# Patient Record
Sex: FEMALE | Race: WHITE | NOT HISPANIC OR LATINO | Employment: FULL TIME
[De-identification: names, ages, dates, MRNs, and addresses within clinical notes are randomized per-mention and may not be internally consistent; named-entity substitution may affect disease eponyms.]

---

## 2017-07-08 ENCOUNTER — HEALTH MAINTENANCE LETTER (OUTPATIENT)
Age: 47
End: 2017-07-08

## 2017-07-10 ENCOUNTER — OFFICE VISIT (OUTPATIENT)
Dept: PODIATRY | Facility: CLINIC | Age: 47
End: 2017-07-10
Payer: COMMERCIAL

## 2017-07-10 ENCOUNTER — RADIANT APPOINTMENT (OUTPATIENT)
Dept: GENERAL RADIOLOGY | Facility: CLINIC | Age: 47
End: 2017-07-10
Attending: PODIATRIST
Payer: COMMERCIAL

## 2017-07-10 VITALS — BODY MASS INDEX: 23.9 KG/M2 | WEIGHT: 140 LBS | HEIGHT: 64 IN

## 2017-07-10 DIAGNOSIS — S93.104A TOE DISLOCATION, RIGHT, INITIAL ENCOUNTER: ICD-10-CM

## 2017-07-10 DIAGNOSIS — S99.921A INJURY, FOOT, RIGHT, INITIAL ENCOUNTER: Primary | ICD-10-CM

## 2017-07-10 PROCEDURE — 26775 TREAT FINGER DISLOCATION: CPT | Mod: T8 | Performed by: PODIATRIST

## 2017-07-10 PROCEDURE — 73630 X-RAY EXAM OF FOOT: CPT | Mod: RT

## 2017-07-10 PROCEDURE — 73660 X-RAY EXAM OF TOE(S): CPT | Mod: RT

## 2017-07-10 PROCEDURE — 99204 OFFICE O/P NEW MOD 45 MIN: CPT | Mod: 25 | Performed by: PODIATRIST

## 2017-07-10 NOTE — PROGRESS NOTES
PATIENT HISTORY:  Caron Velazquez is a 47 year old female who presents to clinic for a right 4th toe injury.  She suspects it is dislocated.  Occurred yesterday on a step after dropping a pot.  8-10/10 pain.  She has trouble walking.  Works in sales.      Review of Systems:  Patient denies fever, chills, rash, wound, stiffness, limping, numbness, weakness, heart burn, blood in stool, chest pain with activity, calf pain when walking, shortness of breath with activity, chronic cough, easy bleeding/bruising, swelling of ankles, excessive thirst, fatigue, depression, anxiety.       PAST MEDICAL HISTORY:   Past Medical History:   Diagnosis Date     Bulimia nervosa     Bulemia--from [age] y/o     Depressive disorder, not elsewhere classified     anxiety/depression-on prozac for about 20 yrs        PAST SURGICAL HISTORY:   Past Surgical History:   Procedure Laterality Date     NO HISTORY OF SURGERY          MEDICATIONS:   Current Outpatient Prescriptions:      valACYclovir (VALTREX) 500 MG tablet, Take 1 tablet (500 mg) by mouth daily, Disp: 90 tablet, Rfl: 3     Calcium Carbonate-Vitamin D (CALCIUM + D PO), Take  by mouth., Disp: , Rfl:      Pediatric Multivitamins-Fl (MULTI VITAMIN/FLUORIDE) 0.25 MG CHEW, Take  by mouth., Disp: , Rfl:      ALLERGIES:    Allergies   Allergen Reactions     Nkda [No Known Drug Allergies]         SOCIAL HISTORY:   Social History     Social History     Marital status:      Spouse name: N/A     Number of children: 2     Years of education: N/A     Occupational History      Stay At Home Parent     Social History Main Topics     Smoking status: Never Smoker     Smokeless tobacco: Never Used      Comment: socially in college     Alcohol use Yes      Comment: occ     Drug use: No     Sexual activity: Yes     Partners: Male     Birth control/ protection: Condom     Other Topics Concern     Parent/Sibling W/ Cabg, Mi Or Angioplasty Before 65f 55m? No     Social History Narrative        FAMILY  "HISTORY:   Family History   Problem Relation Age of Onset     Asthma No family hx of      C.A.D. No family hx of      DIABETES No family hx of      Hypertension Mother      CEREBROVASCULAR DISEASE Mother      Breast Cancer No family hx of      Cancer - colorectal Mother      Depression Mother      Depression Sister      Depression Brother      Depression Brother      Depression Brother      Alcohol/Drug Father      Alcohol/Drug Sister      Alcohol/Drug Brother      Thyroid Disease Mother      Thyroid Disease Sister      Thyroid Disease Sister         EXAM:Vitals: Ht 5' 4\" (1.626 m)  Wt 140 lb (63.5 kg)  BMI 24.03 kg/m2  BMI= Body mass index is 24.03 kg/(m^2).    General appearance: Patient is alert and fully cooperative with history & exam.  No sign of distress is noted during the visit.     Psychiatric: Affect is pleasant & appropriate.  Patient appears motivated to improve health.     Respiratory: Breathing is regular & unlabored while sitting.     HEENT: Hearing is intact to spoken word.  Speech is clear.  No gross evidence of visual impairment that would impact ambulation.     Dermatologic: Skin is intact to the right foot without significant lesions, rash or abrasion.  No paronychia or evidence of soft tissue infection is noted.     Vascular: DP & PT pulses are intact & regular on the right.  Mild right 4th toe edema.  CFT and skin temperature are normal.  Neurovascular intact to R 4th toe.     Neurologic: Lower extremity sensation is intact to light touch.  No evidence of weakness or contracture in the lower extremities.  No evidence of neuropathy.     Musculoskeletal: Right 4th toe appears dorsally dislocated at the PIPJ.  Patient is ambulatory without assistive device or brace.  No gross ankle deformity noted.  No foot or ankle joint effusion is noted.    Films reviewed with pt.  XRs show dislocation of right 4th toe at PIPJ.  No obvious fracture.  Post reduction films show good reduction at the PIPJ.   "   ASSESSMENT: R 4th toe PIPJ dislocation, traumatic     PLAN:  Reviewed patient's chart in epic.  Discussed condition and treatment options including pros and cons.    I recommended closed reduction.  Pt agreed.  After alcohol prep I anesthetized the right 4th toe with 2cc of 1% lidocaine plain.  The toe was manipulated, the joint reduced, and splinted to the 3rd toe with coban.  Pt tolerated well.    Pt placed in post op shoe.  RICE advised.  Maintain splinting.  Coban given for change of splint as needed.  Crutches given for use as needed.  Minimal walking.      Discussed possible pinning of toe if reduction does not hold.    F/u in 2 wks.       Dg Rondon, ALEK, FACFAS

## 2017-07-10 NOTE — MR AVS SNAPSHOT
After Visit Summary   7/10/2017    Caron Velazquez    MRN: [id number]           Patient Information     Date Of Birth          1970        Visit Information        Provider Department      7/10/2017 3:00 PM Dg Rondon DPM [facility]        Today's Diagnoses     Injury, foot, right, initial encounter    -  1    Toe dislocation, right, initial encounter          Care Instructions    Splint toe with coban  Minimal weight bearing as tolerated in post op shoe with crutch assist as needed  Follow up in 2 weeks      PRICE Therapy    Many aches and pains throughout the foot and ankle can be helped with many simple treatments.  This is usually described as PRICE Therapy.      P - Protection - often times, inflammation/pain in the lower extremity is not able to improve simply because the areas involved are never allowed to rest.  Every step we take can bother the problematic area.  Protecting those areas is an important step in the healing process.  This may involve a walking cast boot, a special insert/orthotic device, an ankle brace, or simply avoiding barefoot walking.    R - Rest - in addition to protecting the foot/ankle, resting is an important, but often times difficult, treatment option.  Getting off your feet when they bother you, and specifically avoiding activities that cause pain/discomfort, are very beneficial to prevent, and treat, foot/ankle pain.      I - Ice - icing regularly can help to decrease inflammation and swelling in the foot, thus decreasing pain.  Using an ice pack or a bag of frozen peas works very well.  Ice for 20 minutes multiple times per day as needed.  Do not place the ice directly on the skin as this can cause tissue damage.    C - Compression - using a compression wrap or an ACE wrap can help to decrease swelling, which can help to decrease pain.  Wearing the wraps is generally not needed at night, but they should be worn on a regular basis when  you are going to be on your feet for prolonged periods as gravity tends to pull fluids down to your feet/ankles.    E - Elevation - elevating your lower extremities multiple times daily for 15-20 minutes can help to decrease swelling, which works well in decreasing pain levels.      NSAID/Tylenol - An anti-inflammatory, like Aleve or ibuprofen, and/or a pain medication, such as Tylenol, can help to improve pain levels and get the issue resolved sooner rather than later.  Anyone with liver issues should be careful with Tylenol, and anyone with high blood pressure or heart, stomach or kidney issues should be careful with anti-inflammatories.  Please ask if you have questions about these medications, including dosage.              Follow-ups after your visit        Who to contact     If you have questions or need follow up information about today's clinic visit or your schedule please contact [facility] directly at [phone].  Normal or non-critical lab and imaging results will be communicated to you by [employer], letter or phone within 4 business days after the clinic has received the results. If you do not hear from us within 7 days, please contact the clinic through [employer] or phone. If you have a critical or abnormal lab result, we will notify you by phone as soon as possible.  Submit refill requests through [employer] or call your pharmacy and they will forward the refill request to us. Please allow 3 business days for your refill to be completed.          Additional Information About Your Visit        [employer] Information     [employer] gives you secure access to your electronic health record. If you see a primary care provider, you can also send messages to your care team and make appointments. If you have questions, please call your primary care clinic.  If you do not have a primary care provider, please call [phone] and they will assist you.        Care EveryWhere ID     This is your Care  "EveryWhere ID. This could be used by other organizations to access your [facility] medical records  [id number]        Your Vitals Were     Height BMI (Body Mass Index)                5' 4\" (1.626 m) 24.03 kg/m2           Blood Pressure from Last 3 Encounters:   12/17/13 118/68   10/16/13 122/84   09/13/12 116/66    Weight from Last 3 Encounters:   07/10/17 140 lb (63.5 kg)   12/17/13 136 lb 2 oz (61.7 kg)   10/16/13 139 lb (63 kg)              We Performed the Following     XR Foot Right G/E 3 Views     XR Toe Right G/E 2 Views        Primary Care Provider Office Phone # Fax #    Nuha RamírezMEGAN [facility] [phone]       [facility] [address] [facility] [address]        Equal Access to Services     DANIEL PAYAN : Hadii sunny ku hadasho Soomaali, waaxda luqadaha, qaybta kaalmada adeegyada, eloisa doll haynasima allan . So [facility] [phone].    ATENCIÓN: Si habla español, tiene a champion disposición servicios gratuitos de asistencia lingüística. Llame al [phone].    We comply with applicable federal civil rights laws and [place] laws. We do not discriminate on the basis of race, color, national origin, age, disability sex, sexual orientation or gender identity.            Thank you!     Thank you for choosing [facility]  for your care. Our goal is always to provide you with excellent care. Hearing back from our patients is one way we can continue to improve our services. Please take a few minutes to complete the written survey that you may receive in the mail after your visit with us. Thank you!             Your Updated Medication List - Protect others around you: Learn how to safely use, store and throw away your medicines at www.disposemymeds.org.          This list is accurate as of: 7/10/17  3:58 PM.  Always use your most recent med list.                   Brand Name Dispense Instructions for use Diagnosis    CALCIUM + D PO      Take  by " mouth.        MULTI VITAMIN/FLUORIDE 0.25 MG Chew      Take  by mouth.        valACYclovir 500 MG tablet    VALTREX    90 tablet    Take 1 tablet (500 mg) by mouth daily    Herpes infection

## 2017-07-10 NOTE — NURSING NOTE
"Chief Complaint   Patient presents with     Toe Pain     R foot 4th toe dislocated DOI: 7/9/17       Initial Ht 5' 4\" (1.626 m)  Wt 140 lb (63.5 kg)  BMI 24.03 kg/m2 Estimated body mass index is 24.03 kg/(m^2) as calculated from the following:    Height as of this encounter: 5' 4\" (1.626 m).    Weight as of this encounter: 140 lb (63.5 kg).  Medication Reconciliation: catherine Osborn MA July 10, 2017 3:05 PM  "

## 2017-07-10 NOTE — LETTER
7/10/2017         RE: Caron Velazquez  [address] [facility] [address]        Dear Colleague,    Thank you for referring your patient, Caron Velazquez, to the [facility]. Please see a copy of my visit note below.    BMI is normal.  TING Osborn MA July 10, 2017 3:05 PM      PATIENT HISTORY:  Caron Velazquez is a 47 year old female who presents to clinic for a right 4th toe injury.  She suspects it is dislocated.  Occurred yesterday on a step after dropping a pot.  8-10/10 pain.  She has trouble walking.  Works in sales.      Review of Systems:  Patient denies fever, chills, rash, wound, stiffness, limping, numbness, weakness, heart burn, blood in stool, chest pain with activity, calf pain when walking, shortness of breath with activity, chronic cough, easy bleeding/bruising, swelling of ankles, excessive thirst, fatigue, depression, anxiety.       PAST MEDICAL HISTORY:   Past Medical History:   Diagnosis Date     Bulimia nervosa     Bulemia--from [age] y/o     Depressive disorder, not elsewhere classified     anxiety/depression-on prozac for about 20 yrs        PAST SURGICAL HISTORY:   Past Surgical History:   Procedure Laterality Date     NO HISTORY OF SURGERY          MEDICATIONS:   Current Outpatient Prescriptions:      valACYclovir (VALTREX) 500 MG tablet, Take 1 tablet (500 mg) by mouth daily, Disp: 90 tablet, Rfl: 3     Calcium Carbonate-Vitamin D (CALCIUM + D PO), Take  by mouth., Disp: , Rfl:      Pediatric Multivitamins-Fl (MULTI VITAMIN/FLUORIDE) 0.25 MG CHEW, Take  by mouth., Disp: , Rfl:      ALLERGIES:    Allergies   Allergen Reactions     Nkda [No Known Drug Allergies]         SOCIAL HISTORY:   Social History     Social History     Marital status:      Spouse name: N/A     Number of children: 2     Years of education: N/A     Occupational History      Stay At Home Parent     Social History Main Topics     Smoking status: Never Smoker     Smokeless tobacco: Never Used       "Comment: socially in college     Alcohol use Yes      Comment: occ     Drug use: No     Sexual activity: Yes     Partners: Male     Birth control/ protection: Condom     Other Topics Concern     Parent/Sibling W/ Cabg, Mi Or Angioplasty Before 65f 55m? No     Social History Narrative        FAMILY HISTORY:   Family History   Problem Relation Age of Onset     Asthma No family hx of      C.A.D. No family hx of      DIABETES No family hx of      Hypertension Mother      CEREBROVASCULAR DISEASE Mother      Breast Cancer No family hx of      Cancer - colorectal Mother      Depression Mother      Depression Sister      Depression Brother      Depression Brother      Depression Brother      Alcohol/Drug Father      Alcohol/Drug Sister      Alcohol/Drug Brother      Thyroid Disease Mother      Thyroid Disease Sister      Thyroid Disease Sister         EXAM:Vitals: Ht 5' 4\" (1.626 m)  Wt 140 lb (63.5 kg)  BMI 24.03 kg/m2  BMI= Body mass index is 24.03 kg/(m^2).    General appearance: Patient is alert and fully cooperative with history & exam.  No sign of distress is noted during the visit.     Psychiatric: Affect is pleasant & appropriate.  Patient appears motivated to improve health.     Respiratory: Breathing is regular & unlabored while sitting.     HEENT: Hearing is intact to spoken word.  Speech is clear.  No gross evidence of visual impairment that would impact ambulation.     Dermatologic: Skin is intact to the right foot without significant lesions, rash or abrasion.  No paronychia or evidence of soft tissue infection is noted.     Vascular: DP & PT pulses are intact & regular on the right.  Mild right 4th toe edema.  CFT and skin temperature are normal.  Neurovascular intact to R 4th toe.     Neurologic: Lower extremity sensation is intact to light touch.  No evidence of weakness or contracture in the lower extremities.  No evidence of neuropathy.     Musculoskeletal: Right 4th toe appears dorsally dislocated at " the PIPJ.  Patient is ambulatory without assistive device or brace.  No gross ankle deformity noted.  No foot or ankle joint effusion is noted.    Films reviewed with pt.  XRs show dislocation of right 4th toe at PIPJ.  No obvious fracture.  Post reduction films show good reduction at the PIPJ.     ASSESSMENT: R 4th toe PIPJ dislocation, traumatic     PLAN:  Reviewed patient's chart in epic.  Discussed condition and treatment options including pros and cons.    I recommended closed reduction.  Pt agreed.  After alcohol prep I anesthetized the right 4th toe with 2cc of 1% lidocaine plain.  The toe was manipulated, the joint reduced, and splinted to the 3rd toe with coban.  Pt tolerated well.    Pt placed in post op shoe.  RICE advised.  Maintain splinting.  Coban given for change of splint as needed.  Crutches given for use as needed.  Minimal walking.      Discussed possible pinning of toe if reduction does not hold.    F/u in 2 wks.       Dg Rondon DPM, FACFAS      Again, thank you for allowing me to participate in the care of your patient.        Sincerely,        Dg Rondon DPM

## 2017-07-10 NOTE — PATIENT INSTRUCTIONS
Splint toe with coban  Minimal weight bearing as tolerated in post op shoe with crutch assist as needed  Follow up in 2 weeks      PRICE Therapy    Many aches and pains throughout the foot and ankle can be helped with many simple treatments.  This is usually described as PRICE Therapy.      P - Protection - often times, inflammation/pain in the lower extremity is not able to improve simply because the areas involved are never allowed to rest.  Every step we take can bother the problematic area.  Protecting those areas is an important step in the healing process.  This may involve a walking cast boot, a special insert/orthotic device, an ankle brace, or simply avoiding barefoot walking.    R - Rest - in addition to protecting the foot/ankle, resting is an important, but often times difficult, treatment option.  Getting off your feet when they bother you, and specifically avoiding activities that cause pain/discomfort, are very beneficial to prevent, and treat, foot/ankle pain.      I - Ice - icing regularly can help to decrease inflammation and swelling in the foot, thus decreasing pain.  Using an ice pack or a bag of frozen peas works very well.  Ice for 20 minutes multiple times per day as needed.  Do not place the ice directly on the skin as this can cause tissue damage.    C - Compression - using a compression wrap or an ACE wrap can help to decrease swelling, which can help to decrease pain.  Wearing the wraps is generally not needed at night, but they should be worn on a regular basis when you are going to be on your feet for prolonged periods as gravity tends to pull fluids down to your feet/ankles.    E - Elevation - elevating your lower extremities multiple times daily for 15-20 minutes can help to decrease swelling, which works well in decreasing pain levels.      NSAID/Tylenol - An anti-inflammatory, like Aleve or ibuprofen, and/or a pain medication, such as Tylenol, can help to improve pain levels and get  the issue resolved sooner rather than later.  Anyone with liver issues should be careful with Tylenol, and anyone with high blood pressure or heart, stomach or kidney issues should be careful with anti-inflammatories.  Please ask if you have questions about these medications, including dosage.

## 2017-07-18 ENCOUNTER — APPOINTMENT (OUTPATIENT)
Dept: GENERAL RADIOLOGY | Facility: CLINIC | Age: 47
End: 2017-07-18
Attending: EMERGENCY MEDICINE
Payer: COMMERCIAL

## 2017-07-18 ENCOUNTER — HOSPITAL ENCOUNTER (EMERGENCY)
Facility: CLINIC | Age: 47
Discharge: HOME | End: 2017-07-18
Attending: EMERGENCY MEDICINE | Admitting: EMERGENCY MEDICINE
Payer: COMMERCIAL

## 2017-07-18 VITALS
SYSTOLIC BLOOD PRESSURE: 118 MMHG | OXYGEN SATURATION: 98 % | DIASTOLIC BLOOD PRESSURE: 82 MMHG | WEIGHT: 137 LBS | HEIGHT: 64 IN | TEMPERATURE: 97.4 F | RESPIRATION RATE: 16 BRPM | BODY MASS INDEX: 23.39 KG/M2 | HEART RATE: 78 BPM

## 2017-07-18 DIAGNOSIS — R55 NEAR SYNCOPE: ICD-10-CM

## 2017-07-18 DIAGNOSIS — R00.2 PALPITATIONS: ICD-10-CM

## 2017-07-18 DIAGNOSIS — F41.9 ANXIETY: ICD-10-CM

## 2017-07-18 LAB
ANION GAP SERPL CALCULATED.3IONS-SCNC: 9 MMOL/L (ref 3–14)
BASOPHILS # BLD AUTO: 0.1 10E9/L (ref 0–0.2)
BASOPHILS NFR BLD AUTO: 1.8 %
BUN SERPL-MCNC: 20 MG/DL (ref 7–30)
CALCIUM SERPL-MCNC: 8.5 MG/DL (ref 8.5–10.1)
CHLORIDE SERPL-SCNC: 100 MMOL/L (ref 94–109)
CO2 SERPL-SCNC: 21 MMOL/L (ref 20–32)
CREAT SERPL-MCNC: 1.08 MG/DL (ref 0.52–1.04)
CREAT SERPL-MCNC: ABNORMAL MG/DL (ref 0.52–1.04)
DIFFERENTIAL METHOD BLD: ABNORMAL
EOSINOPHIL # BLD AUTO: 0.1 10E9/L (ref 0–0.7)
EOSINOPHIL NFR BLD AUTO: 2.1 %
ERYTHROCYTE [DISTWIDTH] IN BLOOD BY AUTOMATED COUNT: 16.6 % (ref 10–15)
GFR SERPL CREATININE-BSD FRML MDRD: 54 ML/MIN/1.7M2
GFR SERPL CREATININE-BSD FRML MDRD: ABNORMAL ML/MIN/1.7M2
GLUCOSE SERPL-MCNC: 85 MG/DL (ref 70–99)
HCT VFR BLD AUTO: 36.9 % (ref 35–47)
HGB BLD-MCNC: 12.3 G/DL (ref 11.7–15.7)
IMM GRANULOCYTES # BLD: 0 10E9/L (ref 0–0.4)
IMM GRANULOCYTES NFR BLD: 0.2 %
INTERPRETATION ECG - MUSE: NORMAL
LYMPHOCYTES # BLD AUTO: 2.2 10E9/L (ref 0.8–5.3)
LYMPHOCYTES NFR BLD AUTO: 43.8 %
MCH RBC QN AUTO: 29.8 PG (ref 26.5–33)
MCHC RBC AUTO-ENTMCNC: 33.3 G/DL (ref 31.5–36.5)
MCV RBC AUTO: 89 FL (ref 78–100)
MONOCYTES # BLD AUTO: 0.8 10E9/L (ref 0–1.3)
MONOCYTES NFR BLD AUTO: 16.2 %
NEUTROPHILS # BLD AUTO: 1.8 10E9/L (ref 1.6–8.3)
NEUTROPHILS NFR BLD AUTO: 35.9 %
NRBC # BLD AUTO: 0 10*3/UL
NRBC BLD AUTO-RTO: 0 /100
PLATELET # BLD AUTO: 309 10E9/L (ref 150–450)
POTASSIUM SERPL-SCNC: 3.6 MMOL/L (ref 3.4–5.3)
POTASSIUM SERPL-SCNC: 8.9 MMOL/L (ref 3.4–5.3)
RBC # BLD AUTO: 4.13 10E12/L (ref 3.8–5.2)
SODIUM SERPL-SCNC: 130 MMOL/L (ref 133–144)
TROPONIN I BLD-MCNC: 0 UG/L (ref 0–0.1)
TROPONIN I SERPL-MCNC: NORMAL UG/L (ref 0–0.04)
TROPONIN I SERPL-MCNC: NORMAL UG/L (ref 0–0.04)
WBC # BLD AUTO: 5.1 10E9/L (ref 4–11)

## 2017-07-18 PROCEDURE — 84132 ASSAY OF SERUM POTASSIUM: CPT | Performed by: EMERGENCY MEDICINE

## 2017-07-18 PROCEDURE — 99285 EMERGENCY DEPT VISIT HI MDM: CPT | Mod: 25 | Performed by: EMERGENCY MEDICINE

## 2017-07-18 PROCEDURE — 71020 XR CHEST 2 VW: CPT

## 2017-07-18 PROCEDURE — 84484 ASSAY OF TROPONIN QUANT: CPT | Performed by: EMERGENCY MEDICINE

## 2017-07-18 PROCEDURE — 85025 COMPLETE CBC W/AUTO DIFF WBC: CPT | Performed by: EMERGENCY MEDICINE

## 2017-07-18 PROCEDURE — 93010 ELECTROCARDIOGRAM REPORT: CPT | Mod: Z6 | Performed by: EMERGENCY MEDICINE

## 2017-07-18 PROCEDURE — 84484 ASSAY OF TROPONIN QUANT: CPT

## 2017-07-18 PROCEDURE — 82565 ASSAY OF CREATININE: CPT | Performed by: EMERGENCY MEDICINE

## 2017-07-18 PROCEDURE — 99285 EMERGENCY DEPT VISIT HI MDM: CPT | Mod: 25

## 2017-07-18 PROCEDURE — 80048 BASIC METABOLIC PNL TOTAL CA: CPT | Performed by: EMERGENCY MEDICINE

## 2017-07-18 PROCEDURE — 93005 ELECTROCARDIOGRAM TRACING: CPT

## 2017-07-18 ASSESSMENT — ENCOUNTER SYMPTOMS
NERVOUS/ANXIOUS: 1
FEVER: 0
NAUSEA: 1
NUMBNESS: 1
PALPITATIONS: 1
ABDOMINAL PAIN: 0
SHORTNESS OF BREATH: 1
FATIGUE: 1

## 2017-07-18 NOTE — ED AVS SNAPSHOT
[facility], [place], Emergency Department    [address] [place] [address]    Phone:  [phone]                                       Caron Velazquez   MRN: [id number]    Department:  [facility], Emergency Department   Date of Visit:  7/18/2017           After Visit Summary Signature Page     I have received my discharge instructions, and my questions have been answered. I have discussed any challenges I see with this plan with the nurse or doctor.    ..........................................................................................................................................  Patient/Patient Representative Signature      ..........................................................................................................................................  Patient Representative Print Name and Relationship to Patient    ..................................................               ................................................  Date                                            Time    ..........................................................................................................................................  Reviewed by Signature/Title    ...................................................              ..............................................  Date                                                            Time

## 2017-07-18 NOTE — ED NOTES
47 year old female presented by EMS with complaints of increasing shortness of breath with exertion and then today developed diaphoresis and dizziness.

## 2017-07-18 NOTE — ED AVS SNAPSHOT
[facility], Emergency Department    [address] [facility] [address]    Phone:  [phone]                                       Caron Velazquez   MRN: [id number]    Department:  [facility], Emergency Department   Date of Visit:  7/18/2017           Patient Information     Date Of Birth          1970        Your diagnoses for this visit were:     Near syncope     Palpitations     Anxiety        You were seen by Javad Acuña MD.        Discharge Instructions       All of your tests are normal  Obtain a primary care provider    24 Hour Appointment Hotline       To make an appointment at any [facility] clinic, call [phone] ([phone]). If you don't have a family doctor or clinic, we will help you find one. [facility] clinics are conveniently located to serve the needs of you and your family.             Review of your medicines      Our records show that you are taking the medicines listed below. If these are incorrect, please call your family doctor or clinic.        Dose / Directions Last dose taken    ADDERALL PO   Dose:  10 mg        Take 10 mg by mouth daily   Refills:  0        CALCIUM + D PO        Take  by mouth.   Refills:  0        MULTI VITAMIN/FLUORIDE 0.25 MG Chew        Take  by mouth.   Refills:  0        * order for DME   Quantity:  1 Device        Post op shoe wom LG   Refills:  0        * order for DME   Quantity:  1 Device        Pair of crutches   Refills:  0        valACYclovir 500 MG tablet   Commonly known as:  VALTREX   Dose:  500 mg   Quantity:  90 tablet        Take 1 tablet (500 mg) by mouth daily   Refills:  3        * Notice:  This list has 2 medication(s) that are the same as other medications prescribed for you. Read the directions carefully, and ask your doctor or other care provider to review them with you.            Procedures and tests performed during your visit     Procedure/Test Number of Times Performed    Basic metabolic panel 1    CBC with  platelets differential 1    Creatinine 1    EKG 12-lead, tracing only 1    ISTAT troponin nursing POCT 1    Peripheral IV: Standard 1    Potassium 1    Troponin I 2    Troponin POCT 1    XR Chest 2 Views 1      Orders Needing Specimen Collection     None      Pending Results     No orders found from 7/16/2017 to 7/19/2017.            Pending Culture Results     No orders found from 7/16/2017 to 7/19/2017.            Pending Results Instructions     If you had any lab results that were not finalized at the time of your Discharge, you can call the ED Lab Result RN at [phone]. You will be contacted by this team for any positive Lab results or changes in treatment. The nurses are available 7 days a week from 10A to 6:30P.  You can leave a message 24 hours per day and they will return your call.        Thank you for choosing [facility]       Thank you for choosing [facility] for your care. Our goal is always to provide you with excellent care. Hearing back from our patients is one way we can continue to improve our services. Please take a few minutes to complete the written survey that you may receive in the mail after you visit with us. Thank you!        [employer] Information     [employer] gives you secure access to your electronic health record. If you see a primary care provider, you can also send messages to your care team and make appointments. If you have questions, please call your primary care clinic.  If you do not have a primary care provider, please call [phone] and they will assist you.        Care EveryWhere ID     This is your Care EveryWhere ID. This could be used by other organizations to access your [facility] medical records  [id number]        Equal Access to Services     DANIEL PAYAN : Hadii sunny huizaro Sosarah, waaxda luqadaha, qaybta kaalmada adesimon, eloisa aguirre. So [facility] [phone].    ATENCIÓN: Si habla español, tiene a champion disposición servicios gratuitos de  asistencia lingüística. Saw al [phone].    We comply with applicable federal civil rights laws and [place] laws. We do not discriminate on the basis of race, color, national origin, age, disability sex, sexual orientation or gender identity.            After Visit Summary       This is your record. Keep this with you and show to your community pharmacist(s) and doctor(s) at your next visit.

## 2017-07-18 NOTE — ED PROVIDER NOTES
"  History     Chief Complaint   Patient presents with     Shortness of Breath     HPI  Caron Velazquez is a 47 year old female who with a history of anxiety and depression who presents to the Emergency Department via EMS for evaluation of shortness of breath. The patient reports she went to urgent care two weeks ago with concern of cardiac problems where she was told to come to the ED, but she decided not to go. Today, the patient says she was driving with her son when she suddenly experienced body numbness, heart racing, and shortness of breath leading her to return home and call 911. She also says she has felt nauseous all day. She states she has had panic attacks in the past, but cites this was \"different\". Of note, she says she has recently felt stressed and has experienced fatigue while performing daily activities. She denies any recent travel or bilateral lower extremity swelling. She denies any history of cardiac or breathing problems.    Past Medical History:   Diagnosis Date     ADD (attention deficit disorder)      Bulimia nervosa     Bulemia--from [age] y/o     Depressive disorder      Depressive disorder, not elsewhere classified     anxiety/depression-on prozac for about 20 yrs       Past Surgical History:   Procedure Laterality Date     HEAD & NECK SURGERY      [place] teeth     NO HISTORY OF SURGERY         Family History   Problem Relation Age of Onset     Asthma No family hx of      C.A.D. No family hx of      DIABETES No family hx of      Hypertension Mother      CEREBROVASCULAR DISEASE Mother      Breast Cancer No family hx of      Cancer - colorectal Mother      Depression Mother      Depression Sister      Depression Brother      Depression Brother      Depression Brother      Alcohol/Drug Father      Alcohol/Drug Sister      Alcohol/Drug Brother      Thyroid Disease Mother      Thyroid Disease Sister      Thyroid Disease Sister        Social History   Substance Use Topics     Smoking status: Former " "Smoker     Smokeless tobacco: Never Used      Comment: socially in college     Alcohol use Yes      Comment: occ       No current facility-administered medications for this encounter.      Current Outpatient Prescriptions   Medication     valACYclovir (VALTREX) 500 MG tablet     Calcium Carbonate-Vitamin D (CALCIUM + D PO)     Pediatric Multivitamins-Fl (MULTI VITAMIN/FLUORIDE) 0.25 MG CHEW     Amphetamine-Dextroamphetamine (ADDERALL PO)     order for DME     order for DME        Allergies   Allergen Reactions     Nkda [No Known Drug Allergies]          I have reviewed the Medications, Allergies, Past Medical and Surgical History, and Social History in the Epic system.    Review of Systems   Constitutional: Positive for fatigue. Negative for fever.   Respiratory: Positive for shortness of breath.    Cardiovascular: Positive for palpitations. Negative for chest pain and leg swelling.   Gastrointestinal: Positive for nausea. Negative for abdominal pain.   Neurological: Positive for numbness.   Psychiatric/Behavioral: The patient is nervous/anxious.    All other systems reviewed and are negative.      Physical Exam   BP: (!) 145/96  Pulse: 92  Temp: 97.4  F (36.3  C)  Resp: 16  Height: 162.6 cm (5' 4\")  Weight: 62.1 kg (137 lb)  SpO2: 99 %  Physical Exam   Constitutional: She is oriented to person, place, and time. She appears well-developed and well-nourished. She appears distressed.   HENT:   Head: Normocephalic and atraumatic.   Mouth/Throat: Oropharynx is clear and moist.   Eyes: Pupils are equal, round, and reactive to light.   Neck: Normal range of motion. Neck supple. No JVD present.   Cardiovascular: Normal rate, regular rhythm, normal heart sounds and intact distal pulses.    Pulmonary/Chest: Effort normal and breath sounds normal. No respiratory distress. She has no wheezes. She has no rales.   Abdominal: Soft. She exhibits no mass. There is no tenderness. There is no guarding.   Musculoskeletal: Normal range " of motion. She exhibits no edema or tenderness.        Right lower leg: Normal.        Left lower leg: Normal.   Neurological: She is alert and oriented to person, place, and time. No cranial nerve deficit.   Skin: Skin is warm and dry. She is not diaphoretic.   Psychiatric: Her behavior is normal. Thought content normal. Her mood appears anxious.   Nursing note and vitals reviewed.      ED Course     ED Course     Procedures       6:06 PM  The patient was seen and examined by Dr. Acuña in Room ED20.          EKG Interpretation:      Interpreted by Javad Acuña  Time reviewed: 1830  Symptoms at time of EKG: palpitations   Rhythm: normal sinus   Rate: normal  Axis: normal  Ectopy: none  Conduction: normal  ST Segments/ T Waves: No ST-T wave changes  Q Waves: none  Comparison to prior: No old EKG available    Clinical Impression: normal EKG             Labs Ordered and Resulted from Time of ED Arrival Up to the Time of Departure from the ED   CBC WITH PLATELETS DIFFERENTIAL - Abnormal; Notable for the following:        Result Value    RDW 16.6 (*)     All other components within normal limits   BASIC METABOLIC PANEL - Abnormal; Notable for the following:     Sodium 130 (*)     Potassium 8.9 (*)     All other components within normal limits   CREATININE - Abnormal; Notable for the following:     Creatinine 1.08 (*)     GFR Estimate 54 (*)     All other components within normal limits   TROPONIN I   POTASSIUM   TROPONIN I   PERIPHERAL IV CATHETER   ISTAT TROPONIN NURSING POCT   TROPONIN POCT            Assessments & Plan (with Medical Decision Making)   39-year-old female without significant past medical history other than anxiety who presents after episode of palpitations, dyspnea, lightheadedness, weakness, and feeling pre-syncopal.  Clinically, it sounds as though she had a panic attack.  She has no past cardiac history. Here her EKG is normal. Her routine labs and chest x-ray are normal.  She does  have a history of anxiety. She s been having symptoms like this recently.  We did talk about techniques to calm herself down should this happen again, but also demonstrated how to check her pulse and count for 15 seconds to actually document a true pulse when she is having one of these spells.  I don t feel that at this point she requires a Holter monitor or any other additional workup at this time as she did not have syncope.  She had classic symptoms of an anxiety attack.     I have reviewed the nursing notes.    I have reviewed the findings, diagnosis, plan and need for follow up with the patient.  This part of the document was transcribed by Alpa Perez Medical Scribe.   Discharge Medication List as of 7/18/2017  8:20 PM          Final diagnoses:   Near syncope   Palpitations   Anxiety   I, Jair French, am serving as a trained medical scribe to document services personally performed by Rajeev Acuña MD, based on the provider's statements to me.      I, Rajeev Acuña MD, was physically present and have reviewed and verified the accuracy of this note documented by Jair French.       7/18/2017   [facility], [place], EMERGENCY DEPARTMENT     Javad Acuña MD  07/25/17 4306

## 2019-10-05 ENCOUNTER — HEALTH MAINTENANCE LETTER (OUTPATIENT)
Age: 49
End: 2019-10-05

## 2020-11-14 ENCOUNTER — HEALTH MAINTENANCE LETTER (OUTPATIENT)
Age: 50
End: 2020-11-14

## 2021-02-06 ENCOUNTER — HEALTH MAINTENANCE LETTER (OUTPATIENT)
Age: 51
End: 2021-02-06

## 2021-09-12 ENCOUNTER — HEALTH MAINTENANCE LETTER (OUTPATIENT)
Age: 51
End: 2021-09-12

## 2021-12-09 ENCOUNTER — TELEPHONE (OUTPATIENT)
Dept: FAMILY MEDICINE | Facility: CLINIC | Age: 51
End: 2021-12-09
Payer: COMMERCIAL

## 2021-12-09 ASSESSMENT — PATIENT HEALTH QUESTIONNAIRE - PHQ9: SUM OF ALL RESPONSES TO PHQ QUESTIONS 1-9: 20

## 2021-12-09 NOTE — TELEPHONE ENCOUNTER
Left detailed message on patient's voice mail to call for a virtual appointmennt,  Irina Oakes RN  [facility]

## 2021-12-09 NOTE — TELEPHONE ENCOUNTER
Patient is asking for a refill on her medications, Adderall and Prozac.Would at least like to get the Prozac.    Has not been seen in the [facility] system for years.  Has an appointment to establish care and preventive visit. but earliest available was 1/6/22.  Could patient do a virtual visit to get started on Medication next week and do the physical next month?  Her PHQ 9 today was 20 and she feels she is getting worse. Knows the Prozac has worked for her in the past.  Irina Oakes RN  [facility]  PHQ 12/9/2021   PHQ-9 Total Score 20   Q9: Thoughts of better off dead/self-harm past 2 weeks Not at all

## 2021-12-27 NOTE — TELEPHONE ENCOUNTER
Dr Dasilva - Patient is on your schedule to establish care on Thursday, 1/6/22.  Irina Oakes RN  [facility]

## 2022-01-02 ENCOUNTER — HEALTH MAINTENANCE LETTER (OUTPATIENT)
Age: 52
End: 2022-01-02

## 2022-01-03 ASSESSMENT — ENCOUNTER SYMPTOMS
CHILLS: 0
FREQUENCY: 0
FEVER: 0
SORE THROAT: 0
DYSURIA: 0
HEARTBURN: 0
BREAST MASS: 0
MYALGIAS: 0
COUGH: 0
HEMATOCHEZIA: 0
EYE PAIN: 0
DIARRHEA: 0
SHORTNESS OF BREATH: 0
NAUSEA: 0
ARTHRALGIAS: 0
PARESTHESIAS: 0
DIZZINESS: 0
HEADACHES: 0
PALPITATIONS: 0
CONSTIPATION: 0
WEAKNESS: 0
JOINT SWELLING: 0
NERVOUS/ANXIOUS: 1
ABDOMINAL PAIN: 0
HEMATURIA: 0

## 2022-01-06 ENCOUNTER — OFFICE VISIT (OUTPATIENT)
Dept: FAMILY MEDICINE | Facility: CLINIC | Age: 52
End: 2022-01-06
Payer: COMMERCIAL

## 2022-01-06 VITALS
HEART RATE: 95 BPM | RESPIRATION RATE: 16 BRPM | SYSTOLIC BLOOD PRESSURE: 144 MMHG | WEIGHT: 142 LBS | BODY MASS INDEX: 24.37 KG/M2 | DIASTOLIC BLOOD PRESSURE: 100 MMHG | TEMPERATURE: 97.8 F | OXYGEN SATURATION: 97 %

## 2022-01-06 DIAGNOSIS — Z00.00 ROUTINE GENERAL MEDICAL EXAMINATION AT A HEALTH CARE FACILITY: Primary | ICD-10-CM

## 2022-01-06 DIAGNOSIS — F41.1 GENERALIZED ANXIETY DISORDER: ICD-10-CM

## 2022-01-06 DIAGNOSIS — Z12.4 SCREENING FOR CERVICAL CANCER: ICD-10-CM

## 2022-01-06 DIAGNOSIS — F33.1 MAJOR DEPRESSIVE DISORDER, RECURRENT EPISODE, MODERATE (H): ICD-10-CM

## 2022-01-06 DIAGNOSIS — Z12.31 VISIT FOR SCREENING MAMMOGRAM: ICD-10-CM

## 2022-01-06 DIAGNOSIS — R03.0 ELEVATED BLOOD PRESSURE READING WITHOUT DIAGNOSIS OF HYPERTENSION: ICD-10-CM

## 2022-01-06 DIAGNOSIS — F90.2 ATTENTION DEFICIT HYPERACTIVITY DISORDER, COMBINED TYPE: ICD-10-CM

## 2022-01-06 DIAGNOSIS — Z12.11 SCREEN FOR COLON CANCER: ICD-10-CM

## 2022-01-06 DIAGNOSIS — Z13.6 CARDIOVASCULAR SCREENING; LDL GOAL LESS THAN 160: ICD-10-CM

## 2022-01-06 PROBLEM — F41.0 GENERALIZED ANXIETY DISORDER WITH PANIC ATTACKS: Status: ACTIVE | Noted: 2020-06-19

## 2022-01-06 PROBLEM — J30.9 ALLERGIC SINUSITIS: Status: ACTIVE | Noted: 2021-08-01

## 2022-01-06 PROCEDURE — 99214 OFFICE O/P EST MOD 30 MIN: CPT | Mod: 25 | Performed by: FAMILY MEDICINE

## 2022-01-06 PROCEDURE — 84443 ASSAY THYROID STIM HORMONE: CPT | Performed by: FAMILY MEDICINE

## 2022-01-06 PROCEDURE — 80061 LIPID PANEL: CPT | Performed by: FAMILY MEDICINE

## 2022-01-06 PROCEDURE — 99386 PREV VISIT NEW AGE 40-64: CPT | Performed by: FAMILY MEDICINE

## 2022-01-06 PROCEDURE — 36415 COLL VENOUS BLD VENIPUNCTURE: CPT | Performed by: FAMILY MEDICINE

## 2022-01-06 PROCEDURE — G0145 SCR C/V CYTO,THINLAYER,RESCR: HCPCS | Performed by: FAMILY MEDICINE

## 2022-01-06 PROCEDURE — 96127 BRIEF EMOTIONAL/BEHAV ASSMT: CPT | Performed by: FAMILY MEDICINE

## 2022-01-06 PROCEDURE — 87624 HPV HI-RISK TYP POOLED RSLT: CPT | Performed by: FAMILY MEDICINE

## 2022-01-06 RX ORDER — HYDROXYZINE PAMOATE 25 MG/1
25-50 CAPSULE ORAL
COMMUNITY
Start: 2020-05-18 | End: 2022-02-22

## 2022-01-06 RX ORDER — DEXTROAMPHETAMINE SACCHARATE, AMPHETAMINE ASPARTATE, DEXTROAMPHETAMINE SULFATE AND AMPHETAMINE SULFATE 1.25; 1.25; 1.25; 1.25 MG/1; MG/1; MG/1; MG/1
5 TABLET ORAL 2 TIMES DAILY
Qty: 60 TABLET | Refills: 0 | Status: SHIPPED | OUTPATIENT
Start: 2022-01-06 | End: 2022-02-22

## 2022-01-06 RX ORDER — PHENTERMINE HYDROCHLORIDE 37.5 MG/1
TABLET ORAL
COMMUNITY
Start: 2021-11-03 | End: 2022-01-06 | Stop reason: ALTCHOICE

## 2022-01-06 RX ORDER — FLUOXETINE 10 MG/1
10 CAPSULE ORAL DAILY
Qty: 90 CAPSULE | Refills: 1 | Status: SHIPPED | OUTPATIENT
Start: 2022-01-06 | End: 2022-08-30

## 2022-01-06 RX ORDER — FLUOXETINE 10 MG/1
20 CAPSULE ORAL
COMMUNITY
Start: 2020-07-31 | End: 2022-01-06

## 2022-01-06 RX ORDER — FLUOXETINE 10 MG/1
10 CAPSULE ORAL DAILY
Qty: 90 CAPSULE | Refills: 1 | Status: SHIPPED | OUTPATIENT
Start: 2022-01-06 | End: 2022-01-06

## 2022-01-06 ASSESSMENT — ENCOUNTER SYMPTOMS
DIARRHEA: 0
DIZZINESS: 0
MYALGIAS: 0
HEARTBURN: 0
BREAST MASS: 0
ABDOMINAL PAIN: 0
NAUSEA: 0
HEMATOCHEZIA: 0
COUGH: 0
CHILLS: 0
SORE THROAT: 0
DYSURIA: 0
PALPITATIONS: 0
FREQUENCY: 0
SHORTNESS OF BREATH: 0
EYE PAIN: 0
CONSTIPATION: 0
JOINT SWELLING: 0
FEVER: 0
ARTHRALGIAS: 0
PARESTHESIAS: 0
NERVOUS/ANXIOUS: 1
HEADACHES: 0
HEMATURIA: 0
WEAKNESS: 0

## 2022-01-06 NOTE — PATIENT INSTRUCTIONS
You can call any of the centes below to schedule your mammogram, or schedule directly through [employer].  1.  Mammography [facility]  Appointment line [phone]  2   [facility]   Appointment line [phone]   3.   [facility]   Appointment line [phone]      Preventive Health Recommendations  Female Ages 50 - 64    Yearly exam: See your health care provider every year in order to  o Review health changes.   o Discuss preventive care.    o Review your medicines if your doctor has prescribed any.      Get a Pap test every three years (unless you have an abnormal result and your provider advises testing more often).    If you get Pap tests with HPV test, you only need to test every 5 years, unless you have an abnormal result.     You do not need a Pap test if your uterus was removed (hysterectomy) and you have not had cancer.    You should be tested each year for STDs (sexually transmitted diseases) if you're at risk.     Have a mammogram every 1 to 2 years.    Have a colonoscopy at age 50, or have a yearly FIT test (stool test). These exams screen for colon cancer.      Have a cholesterol test every 5 years, or more often if advised.    Have a diabetes test (fasting glucose) every three years. If you are at risk for diabetes, you should have this test more often.     If you are at risk for osteoporosis (brittle bone disease), think about having a bone density scan (DEXA).    Shots: Get a flu shot each year. Get a tetanus shot every 10 years.    Nutrition:     Eat at least 5 servings of fruits and vegetables each day.    Eat whole-grain bread, whole-wheat pasta and brown rice instead of white grains and rice.    Get adequate Calcium and Vitamin D.     Lifestyle    Exercise at least 150 minutes a week (30 minutes a day, 5 days a week). This will help you control your weight and prevent disease.    Limit alcohol to one drink per day.    No smoking.     Wear sunscreen  to prevent skin cancer.     See your dentist every six months for an exam and cleaning.    See your eye doctor every 1 to 2 years.

## 2022-01-06 NOTE — PROGRESS NOTES
SUBJECTIVE:   CC: Caron Velazquez is an 51 year old woman who presents for preventive health visit and chronic disease management.    Depression and Anxiety Follow-Up    How are you doing with your depression since your last visit? No change    How are you doing with your anxiety since your last visit?  No change    Are you having other symptoms that might be associated with depression or anxiety? Yes:  worrying, feeling down    Have you had a significant life event? OTHER: situation stress     Do you have any concerns with your use of alcohol or other drugs? No    Social History     Tobacco Use     Smoking status: Former Smoker     Smokeless tobacco: Never Used     Tobacco comment: socially in [employer]   Substance Use Topics     Alcohol use: Yes     Comment: occ     Drug use: No     PHQ 12/9/2021   PHQ-9 Total Score 20   Q9: Thoughts of better off dead/self-harm past 2 weeks Not at all     ARIANNA-7 SCORE 9/13/2012 10/16/2013 12/17/2013   Total Score 4 18 17       Suicide Assessment Five-step Evaluation and Treatment (SAFE-T)  Patient has been advised of split billing requirements and indicates understanding: Yes     Healthy Habits:     Getting at least 3 servings of Calcium per day:  NO    Bi-annual eye exam:  Yes    Dental care twice a year:  NO    Sleep apnea or symptoms of sleep apnea:  None    Diet:  Regular (no restrictions)    Frequency of exercise:  1 day/week    Duration of exercise:  30-45 minutes    Taking medications regularly:  Yes    Medication side effects:  Not applicable    PHQ-2 Total Score: 2    Additional concerns today:  Yes    ARIANNA-7 SCORE 9/13/2012 10/16/2013 12/17/2013   Total Score 4 18 17     Today's PHQ-2 Score:   PHQ-2 ( 1999 [employer]) 1/3/2022   Q1: Little interest or pleasure in doing things 1   Q2: Feeling down, depressed or hopeless 1   PHQ-2 Score 2   Q1: Little interest or pleasure in doing things Several days   Q2: Feeling down, depressed or hopeless Several days   PHQ-2 Score 2        Abuse: Current or Past (Physical, Sexual or Emotional) - No  Do you feel safe in your environment? Yes      Social History     Tobacco Use     Smoking status: Former Smoker     Smokeless tobacco: Never Used     Tobacco comment: socially in college   Substance Use Topics     Alcohol use: Yes     Comment: occ     If you drink alcohol do you typically have >3 drinks per day or >7 drinks per week? No    No flowsheet data found.    Reviewed orders with patient.  Reviewed health maintenance and updated orders accordingly - Yes  BP Readings from Last 3 Encounters:   01/06/22 (!) 144/100   07/18/17 118/82   12/17/13 118/68    Wt Readings from Last 3 Encounters:   01/06/22 64.4 kg (142 lb)   07/18/17 62.1 kg (137 lb)   07/10/17 63.5 kg (140 lb)                  Current Outpatient Medications   Medication Sig Dispense Refill     amphetamine-dextroamphetamine (ADDERALL) 5 MG tablet Take 1 tablet (5 mg) by mouth 2 times daily 60 tablet 0     FLUoxetine (PROZAC) 10 MG capsule Take 1 capsule (10 mg) by mouth daily 90 capsule 1     hydrOXYzine (VISTARIL) 25 MG capsule Take 25-50 mg by mouth       Amphetamine-Dextroamphetamine (ADDERALL PO) Take 10 mg by mouth daily (Patient not taking: Reported on 1/6/2022)       Allergies   Allergen Reactions     Morphine        Breast Cancer Screening:    FHS-7:   Breast CA Risk Assessment (FHS-7) 1/3/2022   Did any of your first-degree relatives have breast or ovarian cancer? No   Did any of your relatives have bilateral breast cancer? No   Did any man in your family have breast cancer? No   Did any woman in your family have breast and ovarian cancer? No   Did any woman in your family have breast cancer before age 50 y? No   Do you have 2 or more relatives with breast and/or ovarian cancer? No   Do you have 2 or more relatives with breast and/or bowel cancer? No       Mammogram Screening: Recommended annual mammography  Pertinent mammograms are reviewed under the imaging tab.    History  of abnormal Pap smear: NO - age 30-65 PAP every 5 years with negative HPV co-testing recommended  PAP / HPV 2013   PAP (Historical) NIL NIL     Reviewed and updated as needed this visit by clinical staff  Tobacco  Allergies  Meds  Problems  Med Hx  Surg Hx  Fam Hx  Soc Hx         Reviewed and updated as needed this visit by Provider    Meds  Problems  Med Hx  Surg Hx  Fam Hx        Past Medical History:   Diagnosis Date     ADD (attention deficit disorder)      Bulimia nervosa     Bulemia--from [age] y/o     Depressive disorder      Depressive disorder, not elsewhere classified     anxiety/depression-on prozac for about 20 yrs      Past Surgical History:   Procedure Laterality Date     HEAD & NECK SURGERY      [place] teeth     OB History    Para Term  AB Living   3 3 2 0 0 2   SAB IAB Ectopic Multiple Live Births   0 0 0 0 3      # Outcome Date GA Lbr Abisai/2nd Weight Sex Delivery Anes PTL Lv   3 Term 03 40w0d   M    ANGY   2 Term 01 40w0d   M Vag-Forceps   ANGY   1 Para 00    F    DEC      Birth Comments:  Trisomy 18       Review of Systems   Constitutional: Negative for chills and fever.   HENT: Positive for congestion. Negative for ear pain, hearing loss and sore throat.    Eyes: Positive for visual disturbance. Negative for pain.   Respiratory: Negative for cough and shortness of breath.    Cardiovascular: Negative for chest pain, palpitations and peripheral edema.   Gastrointestinal: Negative for abdominal pain, constipation, diarrhea, heartburn, hematochezia and nausea.   Breasts:  Negative for tenderness, breast mass and discharge.   Genitourinary: Negative for dysuria, frequency, genital sores, hematuria, pelvic pain, urgency, vaginal bleeding and vaginal discharge.   Musculoskeletal: Negative for arthralgias, joint swelling and myalgias.   Skin: Negative for rash.   Neurological: Negative for dizziness, weakness, headaches and paresthesias.    Psychiatric/Behavioral: Negative for mood changes. The patient is nervous/anxious.        OBJECTIVE:   BP (!) 144/100 (BP Location: Right arm, Patient Position: Sitting, Cuff Size: Adult Regular)   Pulse 95   Temp 97.8  F (36.6  C) (Temporal)   Resp 16   Wt 64.4 kg (142 lb)   SpO2 97%   BMI 24.37 kg/m    Physical Exam  GENERAL: healthy, alert and no distress  EYES: Eyes grossly normal to inspection, PERRL and conjunctivae and sclerae normal  HENT: ear canals and TM's normal, nose and mouth without ulcers or lesions  NECK: no adenopathy, no asymmetry, masses, or scars and thyroid normal to palpation  RESP: lungs clear to auscultation - no rales, rhonchi or wheezes  BREAST: normal without masses, tenderness or nipple discharge and no palpable axillary masses or adenopathy  CV: regular rate and rhythm, normal S1 S2, no S3 or S4, no murmur, click or rub, no peripheral edema and peripheral pulses strong  ABDOMEN: soft, nontender, no hepatosplenomegaly, no masses and bowel sounds normal   (female): normal female external genitalia, normal urethral meatus, vaginal mucosa pink, moist, well rugated, and normal cervix/adnexa/uterus without masses or discharge  MS: no gross musculoskeletal defects noted, no edema  SKIN: no suspicious lesions or rashes  NEURO: Normal strength and tone, mentation intact and speech normal  PSYCH: mentation appears normal, affect normal/bright      ASSESSMENT/PLAN:   (Z00.00) Routine general medical examination at a health care facility  (primary encounter diagnosis)  routine    (F90.2) Attention deficit hyperactivity disorder, combined type  Comment: At goal  The current medical regimen is effective;  continue present plan and medications.    Plan: amphetamine-dextroamphetamine (ADDERALL) 5 MG         tablet            (F33.1) Major depressive disorder, recurrent episode, moderate (H)  Comment: .  Plan: not at goal, very elevated, associate with ARIANNA as well, see below  She's been on  "fluoxetine in the past and felt it helped, would like to restart, see orders below.  (F41.1) Generalized anxiety disorder  Comment:   Given elevated blood pressure today will also check TSH  Plan: TSH with free T4 reflex, FLUoxetine (PROZAC) 10        MG capsule, DISCONTINUED: FLUoxetine (PROZAC)         10 MG capsule            (R03.0) Elevated blood pressure reading without diagnosis of hypertension  Comment:   BP Readings from Last 3 Encounters:   01/06/22 (!) 144/100   07/18/17 118/82   12/17/13 118/68     Seems to be stress/anxiety related.   Plan: recommend monitoring at home, let me know if continues to be elevated.    GFR Estimate   Date Value Ref Range Status   07/18/2017 54 (L) >60 mL/min/1.7m2 Final     Comment:     Non  GFR Calc   07/18/2017 Not Calculated >60 mL/min/1.7m2 Final       (Z12.4) Screening for cervical cancer  Comment:   Plan: Pap screen with HPV - recommended age 30 - 65         years, HPV Hold (Lab Only), HPV High Risk Types        DNA Cervical            (Z13.6) CARDIOVASCULAR SCREENING; LDL GOAL LESS THAN 160  Comment:   Plan: Lipid panel reflex to direct LDL Non-fasting            (Z12.11) Screen for colon cancer  Comment:   Plan: Adult Gastro Ref - Procedure Only            (Z12.31) Visit for screening mammogram  Comment:   Plan: *MA Screening Digital Bilateral              Patient has been advised of split billing requirements and indicates understanding: Yes  COUNSELING:  Reviewed preventive health counseling, as reflected in patient instructions    Estimated body mass index is 24.37 kg/m  as calculated from the following:    Height as of 7/18/17: 1.626 m (5' 4\").    Weight as of this encounter: 64.4 kg (142 lb).        She reports that she has quit smoking. She has never used smokeless tobacco.      Counseling Resources:  ATP IV Guidelines  Pooled Cohorts Equation Calculator  Breast Cancer Risk Calculator  BRCA-Related Cancer Risk Assessment: FHS-7 Tool  FRAX Risk " Assessment  ICSI Preventive Guidelines  Dietary Guidelines for Americans, 2010  USDA's MyPlate  ASA Prophylaxis  Lung CA Screening    Nuria Dasilva MD  [facility]

## 2022-01-07 LAB
CHOLEST SERPL-MCNC: 233 MG/DL
FASTING STATUS PATIENT QL REPORTED: NO
HDLC SERPL-MCNC: 108 MG/DL
LDLC SERPL CALC-MCNC: 113 MG/DL
NONHDLC SERPL-MCNC: 125 MG/DL
TRIGL SERPL-MCNC: 59 MG/DL
TSH SERPL DL<=0.005 MIU/L-ACNC: 1.66 MU/L (ref 0.4–4)

## 2022-01-11 LAB
BKR LAB AP GYN ADEQUACY: NORMAL
BKR LAB AP GYN INTERPRETATION: NORMAL
BKR LAB AP HPV REFLEX: NORMAL
BKR LAB AP PREVIOUS ABNORMAL: NORMAL
PATH REPORT.COMMENTS IMP SPEC: NORMAL
PATH REPORT.COMMENTS IMP SPEC: NORMAL
PATH REPORT.RELEVANT HX SPEC: NORMAL

## 2022-01-13 PROBLEM — Z87.42 HX OF ABNORMAL CERVICAL PAP SMEAR: Status: ACTIVE | Noted: 2022-01-13

## 2022-01-13 PROBLEM — R03.0 ELEVATED BLOOD PRESSURE READING WITHOUT DIAGNOSIS OF HYPERTENSION: Status: ACTIVE | Noted: 2022-01-13

## 2022-01-13 LAB
HUMAN PAPILLOMA VIRUS 16 DNA: NEGATIVE
HUMAN PAPILLOMA VIRUS 18 DNA: NEGATIVE
HUMAN PAPILLOMA VIRUS FINAL DIAGNOSIS: NORMAL
HUMAN PAPILLOMA VIRUS OTHER HR: NEGATIVE

## 2022-01-13 ASSESSMENT — ANXIETY QUESTIONNAIRES
2. NOT BEING ABLE TO STOP OR CONTROL WORRYING: SEVERAL DAYS
1. FEELING NERVOUS, ANXIOUS, OR ON EDGE: SEVERAL DAYS
GAD7 TOTAL SCORE: 4
6. BECOMING EASILY ANNOYED OR IRRITABLE: NOT AT ALL
7. FEELING AFRAID AS IF SOMETHING AWFUL MIGHT HAPPEN: NOT AT ALL
IF YOU CHECKED OFF ANY PROBLEMS ON THIS QUESTIONNAIRE, HOW DIFFICULT HAVE THESE PROBLEMS MADE IT FOR YOU TO DO YOUR WORK, TAKE CARE OF THINGS AT HOME, OR GET ALONG WITH OTHER PEOPLE: SOMEWHAT DIFFICULT
5. BEING SO RESTLESS THAT IT IS HARD TO SIT STILL: NOT AT ALL
3. WORRYING TOO MUCH ABOUT DIFFERENT THINGS: SEVERAL DAYS

## 2022-01-13 ASSESSMENT — PATIENT HEALTH QUESTIONNAIRE - PHQ9: 5. POOR APPETITE OR OVEREATING: SEVERAL DAYS

## 2022-01-14 PROBLEM — Z87.42 HX OF ABNORMAL CERVICAL PAP SMEAR: Status: RESOLVED | Noted: 2022-01-13 | Resolved: 2022-01-14

## 2022-01-14 ASSESSMENT — ANXIETY QUESTIONNAIRES: GAD7 TOTAL SCORE: 4

## 2022-01-17 NOTE — RESULT ENCOUNTER NOTE
Happy almost birthday!  I hope you have a fantastic day!    Thank you for getting labs done.  Everything looks normal, which is good news.    Your thyroid function is normal, which is good news.  This means that you do not have hyperthyroidism or hypothyroidism.     Your total cholesterol is a little high but everything else is at goal, and your triglycerides and HDL are fantastic!    Desired or goal levels are:  CHOLESTEROL: Desirable is less than 200.  HDL (Good Cholesterol): Desirable is greater than 40 in men and greater than 50 in women.  LDL (Bad Cholesterol): Desirable is less than 160.  TRIGLYCERIDES: Desirable is less than 150.    As you may know, abnormal cholesterol is one factor that increases your risk for heart disease and stroke. You can improve your cholesterol by controlling the amount and type of fat you eat and by increasing your daily activity level.    Here are some ways to improve your nutrition (adapted from the American Academy of Family Practice handout):  Eat less fat (especially butter, Crisco and other saturated fats)  Buy lean cuts of meat; reduce your portions of red meat or substitute poultry or fish  Use skim milk and low-fat dairy products  Eat no more than 4 egg yolks per week  Avoid fried or fast foods that are high in fat  Eat more fruits and vegetables    If you have any questions, please contact the clinic or schedule an appointment with me, thank you!      Sincerely,  Dr. Nuria Dasilva MD  1/17/2022

## 2022-01-21 ENCOUNTER — TELEPHONE (OUTPATIENT)
Dept: GASTROENTEROLOGY | Facility: CLINIC | Age: 52
End: 2022-01-21
Payer: COMMERCIAL

## 2022-01-21 DIAGNOSIS — Z11.59 ENCOUNTER FOR SCREENING FOR OTHER VIRAL DISEASES: Primary | ICD-10-CM

## 2022-01-21 NOTE — TELEPHONE ENCOUNTER
Screening Questions  Blue=prep questions Red=location Green=sedation   1. Are you active on mychart? Y    2. What insurance is in the chart? BCBS     3.  Ordering/Referring Provider: Nuria Dasilva MD    4. BMI 24.0, If greater than 40 review exclusion criteria also will need EXTENDED PREP    5.  Respiratory Screening (If yes to any of the following HOSPITAL setting only):     Do you use daily home oxygen? N  Do you have mod to severe Obstructive Sleep Apnea? N (can be seen at [facility] or hospital setting)    Do you have Pulmonary Hypertension? N   Do you have UNCONTROLLED asthma? N    6. Have you had a heart or lung transplant? N  (If yes, please review exclusion criteria)    7. Are you currently on dialysis?N  (If yes, schedule in HOSPITAL setting only)(If yes, please send Golytely prep)    8. Do you have chronic kidney disease? N (If yes, please send Golytely prep)    9. Have you had a stroke or Transient ischemic attack (TIA) within 6 months? N (If yes, do not schedule at [facility])    10. In the past 6 months, have you had any heart related issues including cardiomyopathy or heart attack? N (If yes, please review exclusion criteria)           If yes, did it require cardiac stenting or other implantable device?N  (If yes, please review exclusion criteria)      11. Do you have any implantable devices in your body (pacemaker, defib, LVAD)? N (If yes, schedule at [facility])    12. Do you take nitroglycerin? If yes, how often? N (if yes, schedule at HOSPITAL setting)    13. Are you currently taking any blood thinners?N (If yes- inform patient to follow up with PCP or provider for follow up instructions)     14. Are you a diabetic? N (If yes, please send Golytely prep)    15. (Females) Are you currently pregnant? N  If yes, how many weeks?      16. Are you taking any prescription pain medications on a routine schedule? N If yes, MAC sedation and patient will need EXTENDED PREP.    17. Do you have any chemical dependencies  such as alcohol, street drugs, or methadone? N If yes, MAC sedation     18. Do you have any history of post-traumatic stress syndrome, severe anxiety or history of psychosis? N  If yes, MAC sedation.     19. Do you transfer independently? Y    20.  Do you have any issues with constipation? N   If yes, pt will need EXTENDED PREP     21. Preferred Pharmacy for Pre Prescription [id number] - [place], [place] -  [address]    Scheduling Details    Which Colonoscopy Prep was Sent?: MPREP  Type of Procedure Scheduled: COLON  Surgeon: ZARIA  Date of Procedure: 02/07  [facility]  Caller (Please ask for phone number if not scheduled by patient): Caron        Sedation Type: MAC  Conscious Sedation- Needs  for 6 hours after the procedure  MAC/General-Needs  for 24 hours after procedure    Pre-op Required at [facility] [place], [place] and OR for MAC sedation: N  (if yes advise patient they will need a pre-op prior to procedure)      Informed patient they will need an adult  Y  Cannot take any type of public or medical transportation alone    Pre-Procedure Covid test to be completed at [facility] or Externally:  02/03 11A     Confirmed Nurse will call to complete assessment Y    Additional comments:  ([facility] PATIENTS NEED EXTENDED PREP)

## 2022-01-27 ENCOUNTER — TELEPHONE (OUTPATIENT)
Dept: GASTROENTEROLOGY | Facility: CLINIC | Age: 52
End: 2022-01-27
Payer: COMMERCIAL

## 2022-01-27 NOTE — TELEPHONE ENCOUNTER
Patient scheduled for colonoscopy on 2.7.2022.     Covid test scheduled:     Arrival time: 1230    Facility location: [facility]    Sedation type: MAC    Indication for procedure: screening colonoscopy    Referring provider: Nuria Dasilva MD    Bowel prep recommendation: Miralax/Magnesium citrate/Dulcolax    Pre visit planning completed.    Milagro Myles RN

## 2022-01-31 NOTE — TELEPHONE ENCOUNTER
Attempted to contact patient regarding upcoming colonoscopy procedure on 2.7.2022 for pre assessment questions. No answer.     Left message to return call to [phone] #2      Milagro Myles RN

## 2022-02-06 ENCOUNTER — MYC MEDICAL ADVICE (OUTPATIENT)
Dept: FAMILY MEDICINE | Facility: CLINIC | Age: 52
End: 2022-02-06
Payer: COMMERCIAL

## 2022-02-07 NOTE — TELEPHONE ENCOUNTER
RN called patient based on messages.     She states since starting the adderall medication her anxiety is through the roof and she is forgetting all kinds of things.     She has appt tomorrow video with Dr. Dasilva, confirmed with pt she feels safe to wait until then to figure this out. She is not taking the aderall any longer.     WANDY Ramos RN  [facility]

## 2022-02-08 ENCOUNTER — MYC MEDICAL ADVICE (OUTPATIENT)
Dept: FAMILY MEDICINE | Facility: CLINIC | Age: 52
End: 2022-02-08

## 2022-02-08 ENCOUNTER — VIRTUAL VISIT (OUTPATIENT)
Dept: FAMILY MEDICINE | Facility: CLINIC | Age: 52
End: 2022-02-08
Payer: COMMERCIAL

## 2022-02-08 DIAGNOSIS — F41.0 GENERALIZED ANXIETY DISORDER WITH PANIC ATTACKS: Primary | ICD-10-CM

## 2022-02-08 DIAGNOSIS — F33.1 MAJOR DEPRESSIVE DISORDER, RECURRENT EPISODE, MODERATE (H): ICD-10-CM

## 2022-02-08 DIAGNOSIS — F41.1 GENERALIZED ANXIETY DISORDER WITH PANIC ATTACKS: Primary | ICD-10-CM

## 2022-02-08 DIAGNOSIS — F90.2 ATTENTION DEFICIT HYPERACTIVITY DISORDER, COMBINED TYPE: ICD-10-CM

## 2022-02-08 PROCEDURE — 99207 PR NO CHARGE LOS: CPT | Performed by: FAMILY MEDICINE

## 2022-02-08 ASSESSMENT — PATIENT HEALTH QUESTIONNAIRE - PHQ9
SUM OF ALL RESPONSES TO PHQ QUESTIONS 1-9: 15
10. IF YOU CHECKED OFF ANY PROBLEMS, HOW DIFFICULT HAVE THESE PROBLEMS MADE IT FOR YOU TO DO YOUR WORK, TAKE CARE OF THINGS AT HOME, OR GET ALONG WITH OTHER PEOPLE: SOMEWHAT DIFFICULT
SUM OF ALL RESPONSES TO PHQ QUESTIONS 1-9: 15

## 2022-02-08 ASSESSMENT — ANXIETY QUESTIONNAIRES
1. FEELING NERVOUS, ANXIOUS, OR ON EDGE: NEARLY EVERY DAY
7. FEELING AFRAID AS IF SOMETHING AWFUL MIGHT HAPPEN: NEARLY EVERY DAY
GAD7 TOTAL SCORE: 18
GAD7 TOTAL SCORE: 18
7. FEELING AFRAID AS IF SOMETHING AWFUL MIGHT HAPPEN: NEARLY EVERY DAY
GAD7 TOTAL SCORE: 18
3. WORRYING TOO MUCH ABOUT DIFFERENT THINGS: MORE THAN HALF THE DAYS
4. TROUBLE RELAXING: NEARLY EVERY DAY
2. NOT BEING ABLE TO STOP OR CONTROL WORRYING: NEARLY EVERY DAY
5. BEING SO RESTLESS THAT IT IS HARD TO SIT STILL: NEARLY EVERY DAY
6. BECOMING EASILY ANNOYED OR IRRITABLE: SEVERAL DAYS

## 2022-02-08 NOTE — PROGRESS NOTES
Attempted multiple times to invite to video visit, including calling telephone number, unable to reach patient. I left [facility] stating I was trying to reach her for her appointment.     Answers for HPI/ROS submitted by the patient on 2/8/2022  If you checked off any problems, how difficult have these problems made it for you to do your work, take care of things at home, or get along with other people?: Somewhat difficult  PHQ9 TOTAL SCORE: 15  ARIANNA 7 TOTAL SCORE: 18  Depression/Anxiety: Anxiety  Anxiety since last: : worse  Other associated symotome: : Yes  Significant life event: : job concerns, financial concerns, housing concerns, grief or loss  Anxious:: Yes  Current substance use:: No  How many minutes a day do you exercise enough to make your heart beat faster?: 10 to 19  How many days a week do you exercise enough to make your heart beat faster?: 3 or less  How many days per week do you miss taking your medication?: Noelle Reardon is a 52 year old who is being evaluated via a billable video visit.      How would you like to obtain your AVS?   If the video visit is dropped, the invitation should be resent by:   Will anyone else be joining your video visit?     Video Start Time:         Subjective   Caron is a 52 year old who presents for the following health issues     HPI         How many servings of fruits and vegetables do you eat daily?      On average, how many sweetened beverages do you drink each day (Examples: soda, juice, sweet tea, etc.  Do NOT count diet or artificially sweetened beverages)?       How many days per week do you exercise enough to make your heart beat faster?     How many minutes a day do you exercise enough to make your heart beat faster?     How many days per week do you miss taking your medication?         Review of Systems         Objective           Vitals:  No vitals were obtained today due to virtual visit.    Physical Exam                   Video-Visit Details    Type of service:  Video  Visit    Video End Time:visit closed without being done    Originating Location (pt. Location):     Distant Location (provider location):  [facility]     Platform used for Video Visit:

## 2022-02-09 ASSESSMENT — ANXIETY QUESTIONNAIRES: GAD7 TOTAL SCORE: 18

## 2022-02-09 ASSESSMENT — PATIENT HEALTH QUESTIONNAIRE - PHQ9: SUM OF ALL RESPONSES TO PHQ QUESTIONS 1-9: 15

## 2022-02-09 NOTE — TELEPHONE ENCOUNTER
Writer responded via [employer].    NICHOL SalmonN, RN  [facility]

## 2022-02-21 ENCOUNTER — E-VISIT (OUTPATIENT)
Dept: FAMILY MEDICINE | Facility: CLINIC | Age: 52
End: 2022-02-21
Payer: COMMERCIAL

## 2022-02-21 DIAGNOSIS — F33.1 MAJOR DEPRESSIVE DISORDER, RECURRENT EPISODE, MODERATE (H): ICD-10-CM

## 2022-02-21 DIAGNOSIS — F41.1 GENERALIZED ANXIETY DISORDER WITH PANIC ATTACKS: Primary | ICD-10-CM

## 2022-02-21 DIAGNOSIS — F41.0 GENERALIZED ANXIETY DISORDER WITH PANIC ATTACKS: Primary | ICD-10-CM

## 2022-02-21 PROCEDURE — 99422 OL DIG E/M SVC 11-20 MIN: CPT | Performed by: FAMILY MEDICINE

## 2022-02-21 ASSESSMENT — ANXIETY QUESTIONNAIRES
1. FEELING NERVOUS, ANXIOUS, OR ON EDGE: NEARLY EVERY DAY
6. BECOMING EASILY ANNOYED OR IRRITABLE: MORE THAN HALF THE DAYS
7. FEELING AFRAID AS IF SOMETHING AWFUL MIGHT HAPPEN: NEARLY EVERY DAY
4. TROUBLE RELAXING: NEARLY EVERY DAY
GAD7 TOTAL SCORE: 20
7. FEELING AFRAID AS IF SOMETHING AWFUL MIGHT HAPPEN: NEARLY EVERY DAY
2. NOT BEING ABLE TO STOP OR CONTROL WORRYING: NEARLY EVERY DAY
GAD7 TOTAL SCORE: 20
3. WORRYING TOO MUCH ABOUT DIFFERENT THINGS: NEARLY EVERY DAY
8. IF YOU CHECKED OFF ANY PROBLEMS, HOW DIFFICULT HAVE THESE MADE IT FOR YOU TO DO YOUR WORK, TAKE CARE OF THINGS AT HOME, OR GET ALONG WITH OTHER PEOPLE?: VERY DIFFICULT
GAD7 TOTAL SCORE: 20
5. BEING SO RESTLESS THAT IT IS HARD TO SIT STILL: NEARLY EVERY DAY

## 2022-02-21 ASSESSMENT — PATIENT HEALTH QUESTIONNAIRE - PHQ9
10. IF YOU CHECKED OFF ANY PROBLEMS, HOW DIFFICULT HAVE THESE PROBLEMS MADE IT FOR YOU TO DO YOUR WORK, TAKE CARE OF THINGS AT HOME, OR GET ALONG WITH OTHER PEOPLE: EXTREMELY DIFFICULT
SUM OF ALL RESPONSES TO PHQ QUESTIONS 1-9: 17
SUM OF ALL RESPONSES TO PHQ QUESTIONS 1-9: 17

## 2022-02-22 ENCOUNTER — TELEPHONE (OUTPATIENT)
Dept: BEHAVIORAL HEALTH | Facility: CLINIC | Age: 52
End: 2022-02-22
Payer: COMMERCIAL

## 2022-02-22 ASSESSMENT — ANXIETY QUESTIONNAIRES: GAD7 TOTAL SCORE: 20

## 2022-02-22 ASSESSMENT — PATIENT HEALTH QUESTIONNAIRE - PHQ9: SUM OF ALL RESPONSES TO PHQ QUESTIONS 1-9: 17

## 2022-02-22 NOTE — TELEPHONE ENCOUNTER
Provider E-Visit time total (minutes): 15 minutes  ASSESSMENT / PLAN:  (F41.1,  F41.0) Generalized anxiety disorder with panic attacks  (primary encounter diagnosis)  (F33.1) Major depressive disorder, recurrent episode, moderate (H)  Plan: Adult Mental Health  Referral         Hi, it looks like the difficulty remembering things and following through are probably most related to anxiety, NOT ADHD. Please don't be embarassed!  Anxiety takes up a lot of mind energy so it makes it a lot harder to multitask or follow through.  I'm glad the fluoxetine is helping a bit and it's a good idea to not be on a stimulant (even coffee can make your anxiety worse).    Anxiety is very common but can be quite disabling. It is absolutely treatable!       One important thing to know is that treating anxiety can be very difficult initially because your body is in a vigilant, hyperaware state when you're chronically anxious, so any changes in how your body is feeling can trigger additional anxiety response. This psychological response can be triggered by starting a new medication. Sometimes just knowing how your brain and body might react can help you cope with any side effects you might have from the medication so that you can stay on them long enough to be effective.      The best therapy for anxiety is cognitive behavioral therapy, which is essentially coaching to help you redirect spiraling thoughts. Therapy is the gold standard for treating anxiety, as it has the best, most effective long term results. You can contact our clinic anytime to schedule a virtual appointment with Tono Rasmussen, our behavioralist here at [facility], who can help provide therapy for anxiety and depression.    He has virtual and in person appointments.  I will go ahead and put in a mental health referral, although those appointments can be several months out. Please check with your insurance as well, as there might be other preferred  providers in your network you could contact.    Please follow up with me in about one month, and let me know if you have any questions!    Sincerely,  Dr. Nuria Dasilva MD  2/22/2022     Prozac seems to be helping to a degree.   My doctor was correct about adderall - it increased anxiety and I quit taking.  I am still having panic attacks.  I am nervous about my anxiety and memory.   I seem to not be able to remember small tasks.  I start an email then find it was not sent, etc.  I have difficulty remembering even small tasks. I am embarrassed.    ARIANNA-7 SCORE 1/13/2022 2/8/2022 2/21/2022   Total Score - - -   Total Score - 18 (severe anxiety) 20 (severe anxiety)   Total Score 4 18 20         PHQ 12/9/2021 2/8/2022 2/21/2022   PHQ-9 Total Score 20 15 17   Q9: Thoughts of better off dead/self-harm past 2 weeks Not at all Not at all Several days   F/U: Thoughts of suicide or self-harm - - No   F/U: Safety concerns - - No     BP Readings from Last 3 Encounters:   01/06/22 (!) 144/100   07/18/17 118/82   12/17/13 118/68

## 2022-02-23 ENCOUNTER — TELEPHONE (OUTPATIENT)
Dept: BEHAVIORAL HEALTH | Facility: CLINIC | Age: 52
End: 2022-02-23
Payer: COMMERCIAL

## 2022-02-23 NOTE — TELEPHONE ENCOUNTER
Patient had returned the call from the [facility].  Please see epic.  Patient's voicemail stated that she is safe and is more acute episodic and she is returned to baseline.  Patient appreciative call and reinforce that she is safe but would like to follow-up with the [facility].  [facility] left another voicemail and attempt to coordinate a time.

## 2022-02-24 ENCOUNTER — TELEPHONE (OUTPATIENT)
Dept: BEHAVIORAL HEALTH | Facility: CLINIC | Age: 52
End: 2022-02-24
Payer: COMMERCIAL

## 2022-02-27 ENCOUNTER — HEALTH MAINTENANCE LETTER (OUTPATIENT)
Age: 52
End: 2022-02-27

## 2022-04-26 ENCOUNTER — TELEPHONE (OUTPATIENT)
Dept: FAMILY MEDICINE | Facility: CLINIC | Age: 52
End: 2022-04-26
Payer: COMMERCIAL

## 2022-04-26 NOTE — TELEPHONE ENCOUNTER
Reason for Call:  Form, our goal is to have forms completed with 72 hours, however, some forms may require a visit or additional information.    Type of letter, form or note:  disability    Who is the form from?: [employer] (if other please explain)    Where did the form come from: form was faxed in    What clinic location was the form placed at?: [facility]    Where the form was placed: Dr. Dasilva form folder POD C    What number is listed as a contact on the form?: N.A       Additional comments: N/A    Call taken on 4/26/2022 at 12:37 PM by Rohini Crow

## 2022-04-26 NOTE — TELEPHONE ENCOUNTER
Sarah will be faxing STD paperwork to Dr Dasilva for completion for patient.  They have patient asking about a Leave/Short Term Disability for Anxiety and depression.  Irina Oakes RN  [facility]

## 2022-04-29 NOTE — TELEPHONE ENCOUNTER
Hi, she needs to schedule a visit, virtual is fine, to complete this paperwork,(disability) as I'm unclear how long she needs time off or what work restrictions she needs. Last in person visit was 1/6/22, and this wasn't really addressed, no show virtual visit 2/8/22, and evisit only 2/21/22 was last contact.  Sincerely,  Dr. Nuria Dasilva MD  4/29/2022

## 2022-05-02 NOTE — TELEPHONE ENCOUNTER
LVM to get virtual scheduled in order to complete paperwork from [facility] [[email or web address]]

## 2022-05-03 ENCOUNTER — OFFICE VISIT (OUTPATIENT)
Dept: FAMILY MEDICINE | Facility: CLINIC | Age: 52
End: 2022-05-03
Payer: COMMERCIAL

## 2022-05-03 ENCOUNTER — TELEPHONE (OUTPATIENT)
Dept: FAMILY MEDICINE | Facility: CLINIC | Age: 52
End: 2022-05-03
Payer: COMMERCIAL

## 2022-05-03 VITALS — OXYGEN SATURATION: 98 % | HEART RATE: 84 BPM | SYSTOLIC BLOOD PRESSURE: 160 MMHG | DIASTOLIC BLOOD PRESSURE: 90 MMHG

## 2022-05-03 DIAGNOSIS — F41.0 GENERALIZED ANXIETY DISORDER WITH PANIC ATTACKS: ICD-10-CM

## 2022-05-03 DIAGNOSIS — F33.1 MAJOR DEPRESSIVE DISORDER, RECURRENT EPISODE, MODERATE (H): ICD-10-CM

## 2022-05-03 DIAGNOSIS — I10 ESSENTIAL HYPERTENSION WITH GOAL BLOOD PRESSURE LESS THAN 140/90: Primary | ICD-10-CM

## 2022-05-03 DIAGNOSIS — F90.2 ATTENTION DEFICIT HYPERACTIVITY DISORDER, COMBINED TYPE: ICD-10-CM

## 2022-05-03 DIAGNOSIS — R03.0 ELEVATED BLOOD PRESSURE READING WITHOUT DIAGNOSIS OF HYPERTENSION: ICD-10-CM

## 2022-05-03 DIAGNOSIS — I10 BENIGN ESSENTIAL HYPERTENSION WITH TARGET BLOOD PRESSURE BELOW 140/90: ICD-10-CM

## 2022-05-03 DIAGNOSIS — U09.9 COVID-19 LONG HAULER MANIFESTING CHRONIC CONCENTRATION DEFICIT: Primary | ICD-10-CM

## 2022-05-03 DIAGNOSIS — R41.840 COVID-19 LONG HAULER MANIFESTING CHRONIC CONCENTRATION DEFICIT: Primary | ICD-10-CM

## 2022-05-03 DIAGNOSIS — F43.22 ADJUSTMENT DISORDER WITH ANXIETY: ICD-10-CM

## 2022-05-03 DIAGNOSIS — F41.1 GENERALIZED ANXIETY DISORDER WITH PANIC ATTACKS: ICD-10-CM

## 2022-05-03 PROCEDURE — 99214 OFFICE O/P EST MOD 30 MIN: CPT | Performed by: FAMILY MEDICINE

## 2022-05-03 RX ORDER — MODAFINIL 100 MG/1
100 TABLET ORAL DAILY
Qty: 30 TABLET | Refills: 1 | Status: SHIPPED | OUTPATIENT
Start: 2022-05-03 | End: 2022-07-18

## 2022-05-03 RX ORDER — LISINOPRIL 20 MG/1
20 TABLET ORAL DAILY
Qty: 30 TABLET | Refills: 1 | Status: SHIPPED | OUTPATIENT
Start: 2022-05-03 | End: 2022-07-11

## 2022-05-03 NOTE — PROGRESS NOTES
Assessment & Plan     (R41.840,  U09.9) COVID-19 long hauler manifesting chronic concentration deficit  (primary encounter diagnosis)  Completed FMLA , see paperwork  Plan: Adult Post Covid Clinic Referral          (F43.22) Adjustment disorder with anxiety  (F41.1,  F41.0) Generalized anxiety disorder with panic attacks  ARIANNA-7 SCORE 1/13/2022 2/8/2022 2/21/2022   Total Score - - -   Total Score - 18 (severe anxiety) 20 (severe anxiety)   Total Score 4 18 20     Not at goal, significant stress due to persistent brain fog since covid infection Dec 2021.    (F33.1) Major depressive disorder, recurrent episode, moderate (H)  Comment:   PHQ 12/9/2021 2/8/2022 2/21/2022   PHQ-9 Total Score 20 15 17   Q9: Thoughts of better off dead/self-harm past 2 weeks Not at all Not at all Several days   F/U: Thoughts of suicide or self-harm - - No   F/U: Safety concerns - - No      Plan: see above    (F90.2) Attention deficit hyperactivity disorder, combined type  Comment: persistent long term,s he's responded well to provigil, will start this to see if symptoms improve  Plan: modafinil (PROVIGIL) 100 MG tablet            (R03.0) Elevated blood pressure reading without diagnosis of hypertension  But now persistent, will diagnose with hypertension, and start medication as below.  (I10) Benign essential hypertension with target blood pressure below 140/90  Plan: Albumin Random Urine Quantitative with Creat         Ratio, Basic metabolic panel, lisinopril         (ZESTRIL) 20 MG tablet      Return in about 4 weeks (around 5/31/2022) for follow up, Hypertension follow up.    Nuria Dasilva MD  [facility]  ASSESSMENT / PLAN:            Sheree Reardon is a 52 year old who presents for the following health issues     HPI     Short-term disability paperwork - needs it faxed today.    Per patient - discuss modafinil (used while in [place]).    ADHD, Depression and Anxiety Follow-Up, aggravated by long  term covid with brain fog  She reports significant memory problems, word finding, loses her chain of thought and  following through on tasks. She's having a lot of trouble functioning at work, mike in Feb and March, things are getting better but she is still isn't able to perform. She's in [employer], clients include [employer].  She's trying to go on daily walks.    She's been off work since March 1st, using up PTO.    She had covid after attending a wedding in December, positive home test 12/22/21, positive PCR 12/26/21 (mail in)    Social History     Tobacco Use     Smoking status: Former Smoker     Smokeless tobacco: Never Used     Tobacco comment: socially in college   Substance Use Topics     Alcohol use: Yes     Comment: occ     Drug use: No     PHQ 12/9/2021 2/8/2022 2/21/2022   PHQ-9 Total Score 20 15 17   Q9: Thoughts of better off dead/self-harm past 2 weeks Not at all Not at all Several days   F/U: Thoughts of suicide or self-harm - - No   F/U: Safety concerns - - No     ARIANNA-7 SCORE 1/13/2022 2/8/2022 2/21/2022   Total Score - - -   Total Score - 18 (severe anxiety) 20 (severe anxiety)   Total Score 4 18 20     Suicide Assessment Five-step Evaluation and Treatment (SAFE-T)    Review of Systems   Constitutional, HEENT, cardiovascular, pulmonary, GI, , musculoskeletal, neuro, skin, endocrine and psych systems are negative, except as otherwise noted.      Objective    BP (!) 160/90 (BP Location: Left arm)   Pulse 84   SpO2 98%   There is no height or weight on file to calculate BMI.  Physical Exam   GENERAL: healthy, alert and no distress  RESP: no wheezes  NEURO: mentation intact  PSYCH: mentation appears normal and anxious

## 2022-05-03 NOTE — TELEPHONE ENCOUNTER
Forms sent again from [facility]. Unsure if you have original. Placed in your form folder. Thanks!

## 2022-05-03 NOTE — TELEPHONE ENCOUNTER
PT was in clinic by accident for virtual visit with Dr. Dasilva.  PT thought this was a clinic visit.  BP noted as 154/91.    PT's manual BP was very quiet on both arms. 160/90  BP machine was 155/97 on left arm.    PT was glad to have the BP checked in person. PT felt the BP had been high in recent appts.    HX- covid in 12/2021.  Long haul covid syndrome. Memory issues. Forgetting passwords.  Pt's mother has vascular dementia.    PT would be willing to start a BP med.   Pharmacy pended.    OK to call pt or send message about HTN plan on [employer].  YOVANNY Bunch

## 2022-05-03 NOTE — TELEPHONE ENCOUNTER
BP Readings from Last 3 Encounters:   05/03/22 (!) 160/90   01/06/22 (!) 144/100   07/18/17 118/82      GFR Estimate   Date Value Ref Range Status   07/18/2017 54 (L) >60 mL/min/1.7m2 Final     Comment:     Non  GFR Calc   07/18/2017 Not Calculated >60 mL/min/1.7m2 Final     GFR Estimate If Black   Date Value Ref Range Status   07/18/2017 65 >60 mL/min/1.7m2 Final     Comment:      GFR Calc   07/18/2017 Not Calculated >60 mL/min/1.7m2 Final     ASSESSMENT / PLAN:  (I10) Essential hypertension with goal blood pressure less than 140/90  (primary encounter diagnosis)  Comment: start medication as below, return to clinic for labs as ordered below.  Plan: Albumin Random Urine Quantitative with Creat         Ratio, Basic metabolic panel, lisinopril         (ZESTRIL) 20 MG tablet          Sincerely,  Dr. Nuria Dasilva MD  5/3/2022

## 2022-05-13 ENCOUNTER — TELEPHONE (OUTPATIENT)
Dept: PHYSICAL MEDICINE AND REHAB | Facility: CLINIC | Age: 52
End: 2022-05-13
Payer: COMMERCIAL

## 2022-05-16 ENCOUNTER — VIRTUAL VISIT (OUTPATIENT)
Dept: PHYSICAL MEDICINE AND REHAB | Facility: CLINIC | Age: 52
End: 2022-05-16
Attending: FAMILY MEDICINE
Payer: COMMERCIAL

## 2022-05-16 DIAGNOSIS — U09.9 COVID-19 LONG HAULER MANIFESTING CHRONIC CONCENTRATION DEFICIT: Primary | ICD-10-CM

## 2022-05-16 DIAGNOSIS — F90.2 ATTENTION DEFICIT HYPERACTIVITY DISORDER, COMBINED TYPE: ICD-10-CM

## 2022-05-16 DIAGNOSIS — U09.9 POST-COVID CHRONIC HEADACHE: ICD-10-CM

## 2022-05-16 DIAGNOSIS — R51.9 POST-COVID CHRONIC HEADACHE: ICD-10-CM

## 2022-05-16 DIAGNOSIS — R41.841 COGNITIVE COMMUNICATION DEFICIT: ICD-10-CM

## 2022-05-16 DIAGNOSIS — F41.1 GENERALIZED ANXIETY DISORDER WITH PANIC ATTACKS: ICD-10-CM

## 2022-05-16 DIAGNOSIS — F41.0 GENERALIZED ANXIETY DISORDER WITH PANIC ATTACKS: ICD-10-CM

## 2022-05-16 DIAGNOSIS — U09.9 POST-COVID CHRONIC FATIGUE: ICD-10-CM

## 2022-05-16 DIAGNOSIS — G93.32 POST-COVID CHRONIC FATIGUE: ICD-10-CM

## 2022-05-16 DIAGNOSIS — G89.29 POST-COVID CHRONIC HEADACHE: ICD-10-CM

## 2022-05-16 DIAGNOSIS — R41.840 COVID-19 LONG HAULER MANIFESTING CHRONIC CONCENTRATION DEFICIT: Primary | ICD-10-CM

## 2022-05-16 DIAGNOSIS — F33.1 MAJOR DEPRESSIVE DISORDER, RECURRENT EPISODE, MODERATE (H): ICD-10-CM

## 2022-05-16 DIAGNOSIS — M54.2 CERVICALGIA: ICD-10-CM

## 2022-05-16 PROCEDURE — 99205 OFFICE O/P NEW HI 60 MIN: CPT | Mod: 95 | Performed by: PHYSICIAN ASSISTANT

## 2022-05-16 SDOH — SOCIAL STABILITY: SOCIAL NETWORK: I HAVE TROUBLE DOING ALL OF MY REGULAR LEISURE ACTIVITIES WITH OTHERS: USUALLY

## 2022-05-16 SDOH — SOCIAL STABILITY: SOCIAL NETWORK: I HAVE TROUBLE DOING ALL OF THE ACTIVITIES WITH FRIENDS THAT I WANT TO DO: USUALLY

## 2022-05-16 SDOH — SOCIAL STABILITY: SOCIAL NETWORK: I HAVE TROUBLE DOING ALL OF MY USUAL WORK (INCLUDE WORK AT HOME): USUALLY

## 2022-05-16 SDOH — SOCIAL STABILITY: SOCIAL NETWORK: I HAVE TROUBLE DOING ALL OF THE FAMILY ACTIVITIES THAT I WANT TO DO: USUALLY

## 2022-05-16 SDOH — SOCIAL STABILITY: SOCIAL NETWORK

## 2022-05-16 SDOH — SOCIAL STABILITY: SOCIAL NETWORK: PROMIS ABILITY TO PARTICIPATE IN SOCIAL ROLES & ACTIVITIES T-SCORE: 38.8

## 2022-05-16 ASSESSMENT — ENCOUNTER SYMPTOMS
NAIL CHANGES: 1
VOMITING: 0
SPEECH CHANGE: 1
DECREASED APPETITE: 0
SYNCOPE: 0
ARTHRALGIAS: 1
CHILLS: 0
HYPOTENSION: 0
NAUSEA: 0
SORE THROAT: 0
EYE PAIN: 0
HEADACHES: 0
CONSTIPATION: 1
BACK PAIN: 0
JOINT SWELLING: 0
LEG PAIN: 0
POLYDIPSIA: 0
PALPITATIONS: 1
MYALGIAS: 1
POOR WOUND HEALING: 0
FEVER: 0
ALTERED TEMPERATURE REGULATION: 1
WEAKNESS: 1
LOSS OF CONSCIOUSNESS: 0
LIGHT-HEADEDNESS: 0
RECTAL PAIN: 0
PANIC: 1
BLOATING: 1
MEMORY LOSS: 1
WEIGHT GAIN: 1
INCREASED ENERGY: 1
NIGHT SWEATS: 1
DISTURBANCES IN COORDINATION: 0
ABDOMINAL PAIN: 0
NECK MASS: 0
MUSCLE CRAMPS: 1
HEARTBURN: 0
SLEEP DISTURBANCES DUE TO BREATHING: 0
MUSCLE WEAKNESS: 1
SINUS PAIN: 1
JAUNDICE: 0
EYE REDNESS: 1
NERVOUS/ANXIOUS: 1
DIARRHEA: 1
EYE IRRITATION: 1
ORTHOPNEA: 0
TREMORS: 0
SMELL DISTURBANCE: 0
DECREASED CONCENTRATION: 1
EXERCISE INTOLERANCE: 1
SEIZURES: 0
BOWEL INCONTINENCE: 0
DEPRESSION: 1
WEIGHT LOSS: 0
BLOOD IN STOOL: 0
EYE WATERING: 0
HALLUCINATIONS: 0
NECK PAIN: 1
FATIGUE: 1
NUMBNESS: 0
TINGLING: 0
HOARSE VOICE: 0
PARALYSIS: 0
INSOMNIA: 1
HYPERTENSION: 1
POLYPHAGIA: 0
SKIN CHANGES: 0
STIFFNESS: 1
TASTE DISTURBANCE: 0
SINUS CONGESTION: 1
DIZZINESS: 0
TROUBLE SWALLOWING: 0
DOUBLE VISION: 0

## 2022-05-16 ASSESSMENT — ANXIETY QUESTIONNAIRES
4. TROUBLE RELAXING: SEVERAL DAYS
5. BEING SO RESTLESS THAT IT IS HARD TO SIT STILL: NOT AT ALL
8. IF YOU CHECKED OFF ANY PROBLEMS, HOW DIFFICULT HAVE THESE MADE IT FOR YOU TO DO YOUR WORK, TAKE CARE OF THINGS AT HOME, OR GET ALONG WITH OTHER PEOPLE?: SOMEWHAT DIFFICULT
1. FEELING NERVOUS, ANXIOUS, OR ON EDGE: MORE THAN HALF THE DAYS
2. NOT BEING ABLE TO STOP OR CONTROL WORRYING: MORE THAN HALF THE DAYS
GAD7 TOTAL SCORE: 8
7. FEELING AFRAID AS IF SOMETHING AWFUL MIGHT HAPPEN: SEVERAL DAYS
6. BECOMING EASILY ANNOYED OR IRRITABLE: NOT AT ALL
3. WORRYING TOO MUCH ABOUT DIFFERENT THINGS: MORE THAN HALF THE DAYS
GAD7 TOTAL SCORE: 8
7. FEELING AFRAID AS IF SOMETHING AWFUL MIGHT HAPPEN: SEVERAL DAYS
GAD7 TOTAL SCORE: 8

## 2022-05-16 NOTE — LETTER
5/16/2022       RE: Caron Velazquez  [address]  [place] [address]     Dear Colleague,    Thank you for referring your patient, Caron Velazquez, to the [facility]YS[facility]ICINE [facility] REHABILITATION [facility]IN[facility] [place] at [facility]. Please see a copy of my visit note below.       Assessment/ Impression:   1. COVID-19 long hauler manifesting chronic concentration deficit/ Cognitive communication deficit  Patient with cognitive communication deficit since her infection with COVID end of 2021.  Patient now with word recall and communication issues as well as short term memory deficit.  Patient would benefit from vitamin check of B12 and D. We also discussed occupation and speech therapy for communication deficit.  Will get Neuropsychological testing to formally test her memory.   - Adult Post Covid Clinic Referral  - Occupational Therapy Referral; Future  - Speech Therapy Referral; Future  - Adult Neuropsychology Referral; Future  - Vitamin D Deficiency; Future  - Vitamin B12; Future      2. Post-COVID chronic headache/ Cervicalgia  Patient with cervicalgia post COVID. Will refer to Physiatry Dr. Colmenares for evaluation and therapy.   - Physiatry Referral    3. Post-COVID chronic fatigue  Patient with chronic  - Physical Therapy Referral; Future  - Occupational Therapy Referral; Future  - Vitamin D Deficiency; Future  - Vitamin B12; Future  - CRP inflammation; Future  - Erythrocyte sedimentation rate auto; Future    4. Major depressive disorder, recurrent episode, moderate (H)  Patient with depression, anxiety and ADHD who does not have a mental health provider. Discussed Cognitive behavioral therapy and counseling and patient is agreeable.   - Adult Mental Health  Referral; Future    5. Generalized anxiety disorder with panic attacks  See above  - Adult Mental Health  Referral; Future    6. Attention deficit  hyperactivity disorder, combined type  See above  - Adult Mental Health  Referral; Future         Plan:  1. I reviewed present knowledge on long-Covid.  Education was provided and question were answered.  2. Orders/ Referrals as above.  3. I will follow up with Caron Velazquez in 3 months. I will review progress and consider need for any other therapeutic interventions. If there are any questions and/or concerns she will call the clinic.      On day of encounter time spent in chart review and with patient in consultation, exam, education and coordination of care, excluding procedures:  70 minutes     __________________________________  Chika Clifton PA-C  [facility] PHYSICAL MEDICINE AND REHABIL[facility]ATION CLIN[facility]   This 52 year old female presents to the [facility] Medicine Post-COVID clinic as a new consult to evaluate continuing symptoms after COVID infection initially diagnosed 12/22/21. Patient went to a [place] out of state and became sick upon return.   Caron Velazquez initial symptoms were fever, chills, muscle aches, fatigue, congestion, sinus pain, and headache. Treatment was symptomatic. Continuing symptoms include fatigue, weakness, headache, palpitations, chest pain/tightness, brain fog, distractibility, diarrhea, nausea, anxiety and depression.   Patient is still have trouble with remembering passwords or words when speaking.  She does admit to headaches in the front and neck pain. She has fatigue when walking up stairs when she carries laundry, she does think this is improving. Patient states she initially had trouble getting out of bed but it is improving. Patient was recently placed on Modafinil for her ADHD and feels improvement with both energy and concentration. Past medical history is significant for Hypertension, ADHD, depression and anxiety. The patient was vaccinated against COVID X3.  Previous  activity was full time work. She currently is not working due to her concentration deficit post COVID.  Caron Velazquez feels they are functioning at 30% of pre COVID level.      History of COVID-19 infection: 12/22/21  Date of first symptoms: 12/22/21  Diagnosis: PCR and antigen  Hospitalization: No   Treatment: symptomatic  Current Symptoms: see subjective  Goals of Care: increase energy, decrease anxiety, decrease depression, improve thinking and improve quality of life      PHQ Assesment Total Score(s) 5/16/2022   PHQ-2 Score 2   Some recent data might be hidden     ARIANNA-7 Results 5/16/2022   ARIANNA 7 TOTAL SCORE 8 (mild anxiety)   Some recent data might be hidden     PTSD Screen Score 5/16/2022   Have you ever experienced this kind of event? No   Some recent data might be hidden     PROMIS-29 5/16/2022   PROMIS Physical Function T-Score 40.4 (mild dysfunction)   PROMIS Anxiety T-Score 67.3 (moderate)   PROMIS Depression T-Score 57.3 (mild)   PROMIS Fatigue T-Score 64.6 (moderate)   PROMIS Sleep Disturbance T-Score 59.8 (mild)   PROMIS Ability to Participate in Social Roles & Activities T-Score 38.8 (moderate dysfunction)   PROMIS Pain Interference T-Score 41.6 (within normal limits)   PROMIS Pain Intensity 1     Past Medical History:   Diagnosis Date     ADD (attention deficit disorder)      Bulimia nervosa     Bulemia--from [age] y/o     Depressive disorder      Depressive disorder, not elsewhere classified     anxiety/depression-on prozac for about 20 yrs       Past Surgical History:   Procedure Laterality Date     HEAD & NECK SURGERY      [place]       Family History   Problem Relation Age of Onset     Hypertension Mother      Cerebrovascular Disease Mother      Cancer - colorectal Mother 60     Depression Mother      Thyroid Disease Mother      Alcohol/Drug Father      Depression Sister      Alcohol/Drug Sister      Thyroid Disease Sister      Thyroid Disease Sister      Depression Brother       Depression Brother      Depression Brother      Alcohol/Drug Brother      Asthma No family hx of      C.A.D. No family hx of      Diabetes No family hx of      Breast Cancer No family hx of        Social History     Tobacco Use     Smoking status: Former Smoker     Smokeless tobacco: Never Used     Tobacco comment: socially in college   Substance Use Topics     Alcohol use: Yes     Comment: occ     Drug use: No         Current Outpatient Medications:      FLUoxetine (PROZAC) 10 MG capsule, Take 1 capsule (10 mg) by mouth daily, Disp: 90 capsule, Rfl: 1     lisinopril (ZESTRIL) 20 MG tablet, Take 1 tablet (20 mg) by mouth daily, Disp: 30 tablet, Rfl: 1     modafinil (PROVIGIL) 100 MG tablet, Take 1 tablet (100 mg) by mouth daily, Disp: 30 tablet, Rfl: 1    Review of Systems   Review of Systems   Constitutional: Positive for fatigue. Negative for chills and fever.   HENT: Positive for sinus pain. Negative for ear discharge, ear pain, hearing loss, mouth sores, nosebleeds, sore throat, tinnitus and trouble swallowing.    Eyes: Positive for redness. Negative for pain.   Cardiovascular: Positive for palpitations. Negative for chest pain.   Gastrointestinal: Positive for constipation and diarrhea. Negative for abdominal pain, heartburn, nausea, rectal pain and vomiting.   Endocrine: Negative for polydipsia and polyphagia.   Musculoskeletal: Positive for arthralgias, myalgias and neck pain. Negative for back pain and joint swelling.   Skin: Negative for rash.   Neurological: Positive for weakness. Negative for dizziness, tremors, seizures, light-headedness, numbness and headaches.   Psychiatric/Behavioral: Positive for decreased concentration. Negative for hallucinations. The patient is nervous/anxious.         Physical Exam   GENERAL: Healthy, alert and no distress  EYES: Eyes grossly normal to inspection.  No discharge or erythema, or obvious scleral/conjunctival abnormalities.  RESP: No audible wheeze, cough, or visible  cyanosis.  No visible retractions or increased work of breathing.    SKIN: Visible skin clear. No significant rash, abnormal pigmentation or lesions.  NEURO: Cranial nerves grossly intact.  Mentation and speech appropriate for age.  PSYCH: Mentation appears normal, affect normal/bright, judgement and insight intact, normal speech and appearance well-groomed.      No results found for: CRP   No results found for: SED   Last Renal Panel:  Sodium   Date Value Ref Range Status   07/18/2017 130 (L) 133 - 144 mmol/L Final     Potassium   Date Value Ref Range Status   07/18/2017 3.6 3.4 - 5.3 mmol/L Final     Chloride   Date Value Ref Range Status   07/18/2017 100 94 - 109 mmol/L Final     Carbon Dioxide   Date Value Ref Range Status   07/18/2017 21 20 - 32 mmol/L Final     Anion Gap   Date Value Ref Range Status   07/18/2017 9 3 - 14 mmol/L Final     Glucose   Date Value Ref Range Status   07/18/2017 85 70 - 99 mg/dL Final     Urea Nitrogen   Date Value Ref Range Status   07/18/2017 20 7 - 30 mg/dL Final     Creatinine   Date Value Ref Range Status   07/18/2017 1.08 (H) 0.52 - 1.04 mg/dL Final     GFR Estimate   Date Value Ref Range Status   07/18/2017 54 (L) >60 mL/min/1.7m2 Final     Comment:     Non  GFR Calc     Calcium   Date Value Ref Range Status   07/18/2017 8.5 8.5 - 10.1 mg/dL Final      Lab Results   Component Value Date    WBC 5.1 07/18/2017     Lab Results   Component Value Date    RBC 4.13 07/18/2017     Lab Results   Component Value Date    HGB 12.3 07/18/2017     Lab Results   Component Value Date    HCT 36.9 07/18/2017     No components found for: MCT  Lab Results   Component Value Date    MCV 89 07/18/2017     Lab Results   Component Value Date    MCH 29.8 07/18/2017     Lab Results   Component Value Date    MCHC 33.3 07/18/2017     Lab Results   Component Value Date    RDW 16.6 07/18/2017     Lab Results   Component Value Date     07/18/2017      No results found for: A1C   TSH    Date Value Ref Range Status   01/06/2022 1.66 0.40 - 4.00 mU/L Final   09/15/2009 3.34 0.4 - 5.0 mU/L Final      No results found for: VITDT   No results for input(s): MAG in the last [id number] hours.  Last Comprehensive Metabolic Panel:  Sodium   Date Value Ref Range Status   07/18/2017 130 (L) 133 - 144 mmol/L Final     Potassium   Date Value Ref Range Status   07/18/2017 3.6 3.4 - 5.3 mmol/L Final     Chloride   Date Value Ref Range Status   07/18/2017 100 94 - 109 mmol/L Final     Carbon Dioxide   Date Value Ref Range Status   07/18/2017 21 20 - 32 mmol/L Final     Anion Gap   Date Value Ref Range Status   07/18/2017 9 3 - 14 mmol/L Final     Glucose   Date Value Ref Range Status   07/18/2017 85 70 - 99 mg/dL Final     Urea Nitrogen   Date Value Ref Range Status   07/18/2017 20 7 - 30 mg/dL Final     Creatinine   Date Value Ref Range Status   07/18/2017 1.08 (H) 0.52 - 1.04 mg/dL Final     GFR Estimate   Date Value Ref Range Status   07/18/2017 54 (L) >60 mL/min/1.7m2 Final     Comment:     Non  GFR Calc     Calcium   Date Value Ref Range Status   07/18/2017 8.5 8.5 - 10.1 mg/dL Final     Most Recent D-dimer:No lab results found.    Office Visit on 01/06/2022   Component Date Value Ref Range Status     Interpretation 01/06/2022 Negative for Intraepithelial Lesion or Malignancy (NILM)    Final     Comment 01/06/2022    Final                    Value:This result contains rich text formatting which cannot be displayed here.     Specimen Adequacy 01/06/2022 Satisfactory for evaluation, endocervical/transformation zone component absent   Final     Clinical Information 01/06/2022    Final                    Value:This result contains rich text formatting which cannot be displayed here.     Reflex Testing 01/06/2022 Yes regardless of result   Final     Previous Abnormal? 01/06/2022    Final                    Value:This result contains rich text formatting which cannot be displayed here.     Performing  Labs 01/06/2022    Final                    Value:This result contains rich text formatting which cannot be displayed here.     Cholesterol 01/06/2022 233 (A) <200 mg/dL Final     Triglycerides 01/06/2022 59  <150 mg/dL Final     Direct Measure HDL 01/06/2022 108  >=50 mg/dL Final     LDL Cholesterol Calculated 01/06/2022 113 (A) <=100 mg/dL Final     Non HDL Cholesterol 01/06/2022 125  <130 mg/dL Final     Patient Fasting > 8hrs? 01/06/2022 No   Final     TSH 01/06/2022 1.66  0.40 - 4.00 mU/L Final     Other HR HPV 01/06/2022 Negative  Negative Final     HPV16 DNA 01/06/2022 Negative  Negative Final     HPV18 DNA 01/06/2022 Negative  Negative Final     FINAL DIAGNOSIS 01/06/2022    Final                    Value:This result contains rich text formatting which cannot be displayed here.           Sincerely,    Chika Clifton PA-C

## 2022-05-16 NOTE — PROGRESS NOTES
Caron is a 52 year old who is being evaluated via a billable video visit.     Patient denies any changes since echeck-in regarding medication and allergies and states all information entered during echeck-in remains accurate.    How would you like to obtain your AVS? MyChart  If the video visit is dropped, the invitation should be resent by: Text to cell phone: [phone]  Will anyone else be joining your video visit? No       Assessment/ Impression:   1. COVID-19 long hauler manifesting chronic concentration deficit/ Cognitive communication deficit  Patient with cognitive communication deficit since her infection with COVID end of 2021.  Patient now with word recall and communication issues as well as short term memory deficit.  Patient would benefit from vitamin check of B12 and D. We also discussed occupation and speech therapy for communication deficit.  Will get Neuropsychological testing to formally test her memory.   - Adult Post Covid Clinic Referral  - Occupational Therapy Referral; Future  - Speech Therapy Referral; Future  - Adult Neuropsychology Referral; Future  - Vitamin D Deficiency; Future  - Vitamin B12; Future      2. Post-COVID chronic headache/ Cervicalgia  Patient with cervicalgia post COVID. Will refer to Physiatry Dr. Colmenares for evaluation and therapy.   - Physiatry Referral    3. Post-COVID chronic fatigue  Patient with chronic  - Physical Therapy Referral; Future  - Occupational Therapy Referral; Future  - Vitamin D Deficiency; Future  - Vitamin B12; Future  - CRP inflammation; Future  - Erythrocyte sedimentation rate auto; Future    4. Major depressive disorder, recurrent episode, moderate (H)  Patient with depression, anxiety and ADHD who does not have a mental health provider. Discussed Cognitive behavioral therapy and counseling and patient is agreeable.   - Adult Mental Health  Referral; Future    5. Generalized anxiety disorder with panic attacks  See above  - Adult  Mental Health  Referral; Future    6. Attention deficit hyperactivity disorder, combined type  See above  - Adult Mental Health  Referral; Future         Plan:  1. I reviewed present knowledge on long-Covid.  Education was provided and question were answered.  2. Orders/ Referrals as above.  3. I will follow up with Caron MAGANA Callidaisy Caroline in 3 months. I will review progress and consider need for any other therapeutic interventions. If there are any questions and/or concerns she will call the clinic.      On day of encounter time spent in chart review and with patient in consultation, exam, education and coordination of care, excluding procedures:  70 minutes     __________________________________  Chika Clifton PA-C  [facility] PHYSICAL MEDICINE AND REHABILITATION CLINIC [facility]   This 52 year old female presents to the [facility] Post-COVID clinic as a new consult to evaluate continuing symptoms after COVID infection initially diagnosed 12/22/21. Patient went to a family wedding out of state and became sick upon return.   Caron Velazquez initial symptoms were fever, chills, muscle aches, fatigue, congestion, sinus pain, and headache. Treatment was symptomatic. Continuing symptoms include fatigue, weakness, headache, palpitations, chest pain/tightness, brain fog, distractibility, diarrhea, nausea, anxiety and depression.   Patient is still have trouble with remembering passwords or words when speaking.  She does admit to headaches in the front and neck pain. She has fatigue when walking up stairs when she carries laundry, she does think this is improving. Patient states she initially had trouble getting out of bed but it is improving. Patient was recently placed on Modafinil for her ADHD and feels improvement with both energy and concentration. Past medical history is significant for Hypertension, ADHD, depression and  anxiety. The patient was vaccinated against COVID X3.  Previous activity was full time work. She currently is not working due to her concentration deficit post COVID.  Caron Velazquez feels they are functioning at 30% of pre COVID level.      History of COVID-19 infection: 12/22/21  Date of first symptoms: 12/22/21  Diagnosis: PCR and antigen  Hospitalization: No   Treatment: symptomatic  Current Symptoms: see subjective  Goals of Care: increase energy, decrease anxiety, decrease depression, improve thinking and improve quality of life      PHQ Assesment Total Score(s) 5/16/2022   PHQ-2 Score 2   Some recent data might be hidden     ARIANNA-7 Results 5/16/2022   ARIANNA 7 TOTAL SCORE 8 (mild anxiety)   Some recent data might be hidden     PTSD Screen Score 5/16/2022   Have you ever experienced this kind of event? No   Some recent data might be hidden     PROMIS-29 5/16/2022   PROMIS Physical Function T-Score 40.4 (mild dysfunction)   PROMIS Anxiety T-Score 67.3 (moderate)   PROMIS Depression T-Score 57.3 (mild)   PROMIS Fatigue T-Score 64.6 (moderate)   PROMIS Sleep Disturbance T-Score 59.8 (mild)   PROMIS Ability to Participate in Social Roles & Activities T-Score 38.8 (moderate dysfunction)   PROMIS Pain Interference T-Score 41.6 (within normal limits)   PROMIS Pain Intensity 1     Past Medical History:   Diagnosis Date     ADD (attention deficit disorder)      Bulimia nervosa     Bulemia--from [age] y/o     Depressive disorder      Depressive disorder, not elsewhere classified     anxiety/depression-on prozac for about 20 yrs       Past Surgical History:   Procedure Laterality Date     HEAD & NECK SURGERY      [place] teeth       Family History   Problem Relation Age of Onset     Hypertension Mother      Cerebrovascular Disease Mother      Cancer - colorectal Mother 60     Depression Mother      Thyroid Disease Mother      Alcohol/Drug Father      Depression Sister      Alcohol/Drug Sister      Thyroid Disease  Sister      Thyroid Disease Sister      Depression Brother      Depression Brother      Depression Brother      Alcohol/Drug Brother      Asthma No family hx of      C.A.D. No family hx of      Diabetes No family hx of      Breast Cancer No family hx of        Social History     Tobacco Use     Smoking status: Former Smoker     Smokeless tobacco: Never Used     Tobacco comment: socially in college   Substance Use Topics     Alcohol use: Yes     Comment: occ     Drug use: No         Current Outpatient Medications:      FLUoxetine (PROZAC) 10 MG capsule, Take 1 capsule (10 mg) by mouth daily, Disp: 90 capsule, Rfl: 1     lisinopril (ZESTRIL) 20 MG tablet, Take 1 tablet (20 mg) by mouth daily, Disp: 30 tablet, Rfl: 1     modafinil (PROVIGIL) 100 MG tablet, Take 1 tablet (100 mg) by mouth daily, Disp: 30 tablet, Rfl: 1    Review of Systems   Review of Systems   Constitutional: Positive for fatigue. Negative for chills and fever.   HENT: Positive for sinus pain. Negative for ear discharge, ear pain, hearing loss, mouth sores, nosebleeds, sore throat, tinnitus and trouble swallowing.    Eyes: Positive for redness. Negative for pain.   Cardiovascular: Positive for palpitations. Negative for chest pain.   Gastrointestinal: Positive for constipation and diarrhea. Negative for abdominal pain, heartburn, nausea, rectal pain and vomiting.   Endocrine: Negative for polydipsia and polyphagia.   Musculoskeletal: Positive for arthralgias, myalgias and neck pain. Negative for back pain and joint swelling.   Skin: Negative for rash.   Neurological: Positive for weakness. Negative for dizziness, tremors, seizures, light-headedness, numbness and headaches.   Psychiatric/Behavioral: Positive for decreased concentration. Negative for hallucinations. The patient is nervous/anxious.         Physical Exam   GENERAL: Healthy, alert and no distress  EYES: Eyes grossly normal to inspection.  No discharge or erythema, or obvious  scleral/conjunctival abnormalities.  RESP: No audible wheeze, cough, or visible cyanosis.  No visible retractions or increased work of breathing.    SKIN: Visible skin clear. No significant rash, abnormal pigmentation or lesions.  NEURO: Cranial nerves grossly intact.  Mentation and speech appropriate for age.  PSYCH: Mentation appears normal, affect normal/bright, judgement and insight intact, normal speech and appearance well-groomed.      No results found for: CRP   No results found for: SED   Last Renal Panel:  Sodium   Date Value Ref Range Status   07/18/2017 130 (L) 133 - 144 mmol/L Final     Potassium   Date Value Ref Range Status   07/18/2017 3.6 3.4 - 5.3 mmol/L Final     Chloride   Date Value Ref Range Status   07/18/2017 100 94 - 109 mmol/L Final     Carbon Dioxide   Date Value Ref Range Status   07/18/2017 21 20 - 32 mmol/L Final     Anion Gap   Date Value Ref Range Status   07/18/2017 9 3 - 14 mmol/L Final     Glucose   Date Value Ref Range Status   07/18/2017 85 70 - 99 mg/dL Final     Urea Nitrogen   Date Value Ref Range Status   07/18/2017 20 7 - 30 mg/dL Final     Creatinine   Date Value Ref Range Status   07/18/2017 1.08 (H) 0.52 - 1.04 mg/dL Final     GFR Estimate   Date Value Ref Range Status   07/18/2017 54 (L) >60 mL/min/1.7m2 Final     Comment:     Non  GFR Calc     Calcium   Date Value Ref Range Status   07/18/2017 8.5 8.5 - 10.1 mg/dL Final      Lab Results   Component Value Date    WBC 5.1 07/18/2017     Lab Results   Component Value Date    RBC 4.13 07/18/2017     Lab Results   Component Value Date    HGB 12.3 07/18/2017     Lab Results   Component Value Date    HCT 36.9 07/18/2017     No components found for: MCT  Lab Results   Component Value Date    MCV 89 07/18/2017     Lab Results   Component Value Date    MCH 29.8 07/18/2017     Lab Results   Component Value Date    MCHC 33.3 07/18/2017     Lab Results   Component Value Date    RDW 16.6 07/18/2017     Lab Results    Component Value Date     07/18/2017      No results found for: A1C   TSH   Date Value Ref Range Status   01/06/2022 1.66 0.40 - 4.00 mU/L Final   09/15/2009 3.34 0.4 - 5.0 mU/L Final      No results found for: VITDT   No results for input(s): MAG in the last 56614 hours.  Last Comprehensive Metabolic Panel:  Sodium   Date Value Ref Range Status   07/18/2017 130 (L) 133 - 144 mmol/L Final     Potassium   Date Value Ref Range Status   07/18/2017 3.6 3.4 - 5.3 mmol/L Final     Chloride   Date Value Ref Range Status   07/18/2017 100 94 - 109 mmol/L Final     Carbon Dioxide   Date Value Ref Range Status   07/18/2017 21 20 - 32 mmol/L Final     Anion Gap   Date Value Ref Range Status   07/18/2017 9 3 - 14 mmol/L Final     Glucose   Date Value Ref Range Status   07/18/2017 85 70 - 99 mg/dL Final     Urea Nitrogen   Date Value Ref Range Status   07/18/2017 20 7 - 30 mg/dL Final     Creatinine   Date Value Ref Range Status   07/18/2017 1.08 (H) 0.52 - 1.04 mg/dL Final     GFR Estimate   Date Value Ref Range Status   07/18/2017 54 (L) >60 mL/min/1.7m2 Final     Comment:     Non  GFR Calc     Calcium   Date Value Ref Range Status   07/18/2017 8.5 8.5 - 10.1 mg/dL Final     Most Recent D-dimer:No lab results found.    Office Visit on 01/06/2022   Component Date Value Ref Range Status     Interpretation 01/06/2022 Negative for Intraepithelial Lesion or Malignancy (NILM)    Final     Comment 01/06/2022    Final                    Value:This result contains rich text formatting which cannot be displayed here.     Specimen Adequacy 01/06/2022 Satisfactory for evaluation, endocervical/transformation zone component absent   Final     Clinical Information 01/06/2022    Final                    Value:This result contains rich text formatting which cannot be displayed here.     Reflex Testing 01/06/2022 Yes regardless of result   Final     Previous Abnormal? 01/06/2022    Final                    Value:This  result contains rich text formatting which cannot be displayed here.     Performing Labs 01/06/2022    Final                    Value:This result contains rich text formatting which cannot be displayed here.     Cholesterol 01/06/2022 233 (A) <200 mg/dL Final     Triglycerides 01/06/2022 59  <150 mg/dL Final     Direct Measure HDL 01/06/2022 108  >=50 mg/dL Final     LDL Cholesterol Calculated 01/06/2022 113 (A) <=100 mg/dL Final     Non HDL Cholesterol 01/06/2022 125  <130 mg/dL Final     Patient Fasting > 8hrs? 01/06/2022 No   Final     TSH 01/06/2022 1.66  0.40 - 4.00 mU/L Final     Other HR HPV 01/06/2022 Negative  Negative Final     HPV16 DNA 01/06/2022 Negative  Negative Final     HPV18 DNA 01/06/2022 Negative  Negative Final     FINAL DIAGNOSIS 01/06/2022    Final                    Value:This result contains rich text formatting which cannot be displayed here.           Video-Visit Details  Video Start Time: 1:36 PM    Type of service:  Video Visit    Video End Time :2:13 PM    Originating Location (pt. Location): Home    Distant Location (provider location):  [facility] PHYSICAL MEDICINE AND REHABILITATION CLINIC [place]     Platform used for Video Visit: "[employer]

## 2022-05-17 ENCOUNTER — TELEPHONE (OUTPATIENT)
Dept: PHYSICAL MEDICINE AND REHAB | Facility: CLINIC | Age: 52
End: 2022-05-17
Payer: COMMERCIAL

## 2022-05-17 ASSESSMENT — ANXIETY QUESTIONNAIRES: GAD7 TOTAL SCORE: 8

## 2022-05-19 ENCOUNTER — HOSPITAL ENCOUNTER (EMERGENCY)
Facility: CLINIC | Age: 52
Discharge: HOME | End: 2022-05-20
Attending: EMERGENCY MEDICINE | Admitting: EMERGENCY MEDICINE
Payer: COMMERCIAL

## 2022-05-19 VITALS
OXYGEN SATURATION: 100 % | WEIGHT: 142 LBS | DIASTOLIC BLOOD PRESSURE: 96 MMHG | BODY MASS INDEX: 24.37 KG/M2 | TEMPERATURE: 97.9 F | HEART RATE: 88 BPM | RESPIRATION RATE: 20 BRPM | SYSTOLIC BLOOD PRESSURE: 145 MMHG

## 2022-05-19 DIAGNOSIS — R07.81 PLEURITIC CHEST PAIN: ICD-10-CM

## 2022-05-19 PROCEDURE — 93010 ELECTROCARDIOGRAM REPORT: CPT | Performed by: EMERGENCY MEDICINE

## 2022-05-19 PROCEDURE — 99285 EMERGENCY DEPT VISIT HI MDM: CPT | Mod: 25 | Performed by: EMERGENCY MEDICINE

## 2022-05-19 PROCEDURE — 85379 FIBRIN DEGRADATION QUANT: CPT | Performed by: EMERGENCY MEDICINE

## 2022-05-19 PROCEDURE — 93005 ELECTROCARDIOGRAM TRACING: CPT | Performed by: EMERGENCY MEDICINE

## 2022-05-19 PROCEDURE — 36415 COLL VENOUS BLD VENIPUNCTURE: CPT | Performed by: EMERGENCY MEDICINE

## 2022-05-19 PROCEDURE — 80053 COMPREHEN METABOLIC PANEL: CPT | Performed by: STUDENT IN AN ORGANIZED HEALTH CARE EDUCATION/TRAINING PROGRAM

## 2022-05-19 PROCEDURE — 85025 COMPLETE CBC W/AUTO DIFF WBC: CPT | Performed by: STUDENT IN AN ORGANIZED HEALTH CARE EDUCATION/TRAINING PROGRAM

## 2022-05-19 PROCEDURE — 84484 ASSAY OF TROPONIN QUANT: CPT | Performed by: STUDENT IN AN ORGANIZED HEALTH CARE EDUCATION/TRAINING PROGRAM

## 2022-05-20 ENCOUNTER — APPOINTMENT (OUTPATIENT)
Dept: GENERAL RADIOLOGY | Facility: CLINIC | Age: 52
End: 2022-05-20
Attending: STUDENT IN AN ORGANIZED HEALTH CARE EDUCATION/TRAINING PROGRAM
Payer: COMMERCIAL

## 2022-05-20 LAB
ALBUMIN SERPL-MCNC: 4 G/DL (ref 3.4–5)
ALP SERPL-CCNC: 85 U/L (ref 40–150)
ALT SERPL W P-5'-P-CCNC: 29 U/L (ref 0–50)
ANION GAP SERPL CALCULATED.3IONS-SCNC: 9 MMOL/L (ref 3–14)
AST SERPL W P-5'-P-CCNC: 38 U/L (ref 0–45)
ATRIAL RATE - MUSE: 82 BPM
BASOPHILS # BLD AUTO: 0.1 10E3/UL (ref 0–0.2)
BASOPHILS NFR BLD AUTO: 2 %
BILIRUB SERPL-MCNC: 0.2 MG/DL (ref 0.2–1.3)
BUN SERPL-MCNC: 12 MG/DL (ref 7–30)
CALCIUM SERPL-MCNC: 8.5 MG/DL (ref 8.5–10.1)
CHLORIDE BLD-SCNC: 108 MMOL/L (ref 94–109)
CO2 SERPL-SCNC: 24 MMOL/L (ref 20–32)
CREAT SERPL-MCNC: 0.71 MG/DL (ref 0.52–1.04)
D DIMER PPP FEU-MCNC: 0.3 UG/ML FEU (ref 0–0.5)
DIASTOLIC BLOOD PRESSURE - MUSE: NORMAL MMHG
EOSINOPHIL # BLD AUTO: 0.1 10E3/UL (ref 0–0.7)
EOSINOPHIL NFR BLD AUTO: 2 %
ERYTHROCYTE [DISTWIDTH] IN BLOOD BY AUTOMATED COUNT: 12.8 % (ref 10–15)
GFR SERPL CREATININE-BSD FRML MDRD: >90 ML/MIN/1.73M2
GLUCOSE BLD-MCNC: 86 MG/DL (ref 70–99)
HCT VFR BLD AUTO: 40.9 % (ref 35–47)
HGB BLD-MCNC: 14 G/DL (ref 11.7–15.7)
HOLD SPECIMEN: NORMAL
IMM GRANULOCYTES # BLD: 0 10E3/UL
IMM GRANULOCYTES NFR BLD: 0 %
INTERPRETATION ECG - MUSE: NORMAL
LYMPHOCYTES # BLD AUTO: 2.6 10E3/UL (ref 0.8–5.3)
LYMPHOCYTES NFR BLD AUTO: 50 %
MCH RBC QN AUTO: 33.3 PG (ref 26.5–33)
MCHC RBC AUTO-ENTMCNC: 34.2 G/DL (ref 31.5–36.5)
MCV RBC AUTO: 97 FL (ref 78–100)
MONOCYTES # BLD AUTO: 0.5 10E3/UL (ref 0–1.3)
MONOCYTES NFR BLD AUTO: 10 %
NEUTROPHILS # BLD AUTO: 1.8 10E3/UL (ref 1.6–8.3)
NEUTROPHILS NFR BLD AUTO: 36 %
NRBC # BLD AUTO: 0 10E3/UL
NRBC BLD AUTO-RTO: 0 /100
P AXIS - MUSE: 68 DEGREES
PLATELET # BLD AUTO: 314 10E3/UL (ref 150–450)
POTASSIUM BLD-SCNC: 4.2 MMOL/L (ref 3.4–5.3)
PR INTERVAL - MUSE: 142 MS
PROT SERPL-MCNC: 8 G/DL (ref 6.8–8.8)
QRS DURATION - MUSE: 78 MS
QT - MUSE: 368 MS
QTC - MUSE: 429 MS
R AXIS - MUSE: 71 DEGREES
RBC # BLD AUTO: 4.21 10E6/UL (ref 3.8–5.2)
SODIUM SERPL-SCNC: 141 MMOL/L (ref 133–144)
SYSTOLIC BLOOD PRESSURE - MUSE: NORMAL MMHG
T AXIS - MUSE: 70 DEGREES
TROPONIN I SERPL HS-MCNC: 4 NG/L
VENTRICULAR RATE- MUSE: 82 BPM
WBC # BLD AUTO: 5.1 10E3/UL (ref 4–11)

## 2022-05-20 PROCEDURE — 71046 X-RAY EXAM CHEST 2 VIEWS: CPT

## 2022-05-20 PROCEDURE — 71046 X-RAY EXAM CHEST 2 VIEWS: CPT | Mod: 26 | Performed by: RADIOLOGY

## 2022-05-20 NOTE — ED TRIAGE NOTES
Pt states she had sudden onset left chest/rib cage pain.  Pt states its in one direct spot and she is suppose to fly out tomorrow to visit her mom.   Pt states she has a lot of anxiety but she says this feels different.

## 2022-05-20 NOTE — DISCHARGE INSTRUCTIONS
Return to the emergency department immediately for any chest pain, back pain, shortness of breath, weakness, abdominal pain nausea, vomiting, or any other concerns as given or discussed    You can take ibuprofen for pain. The maximum daily dose is 2400 mg and the maximum single dose as a time is 800mg. For your condition, your should take 600mg every 4 hours as needed for pain.

## 2022-05-20 NOTE — ED PROVIDER NOTES
ED Provider Note  [facility]      History     Chief Complaint   Patient presents with     Chest Pain     HPI  Caron MAGANA Anahi Velazquez is a 52 year old female with a PMH of HTN, bulimia nervosa, long COVID syndrome, ARIANNA with panic attacks, adjustment disorder, ADD and MDD who presents the ED today reporting chest pain.    Reports awakening with left chest wall pain radiating from her armpit to under her left breast.  Worse with sitting up and movement of her left arm.  Associated with left arm tingling occasionally.  Also worse with deep inspiration.  Has been lifting heavy boxes recently during a move also endorses some anxiety, no recalled history of direct trauma.  No similar symptoms before.    No fevers or chills no shortness of breath  No abdominal pain no vomiting.  Otherwise has been well and asymptomatic throughout the day yesterday.      Past Medical History  Past Medical History:   Diagnosis Date     ADD (attention deficit disorder)      Bulimia nervosa     Bulemia--from [age] y/o     Depressive disorder      Depressive disorder, not elsewhere classified     anxiety/depression-on prozac for about 20 yrs     Past Surgical History:   Procedure Laterality Date     HEAD & NECK SURGERY      [place] teeth     FLUoxetine (PROZAC) 10 MG capsule  lisinopril (ZESTRIL) 20 MG tablet  modafinil (PROVIGIL) 100 MG tablet      Allergies   Allergen Reactions     Morphine      Family History  Family History   Problem Relation Age of Onset     Hypertension Mother      Cerebrovascular Disease Mother      Cancer - colorectal Mother 60     Depression Mother      Thyroid Disease Mother      Alcohol/Drug Father      Depression Sister      Alcohol/Drug Sister      Thyroid Disease Sister      Thyroid Disease Sister      Depression Brother      Depression Brother      Depression Brother      Alcohol/Drug Brother      Asthma No family hx of      C.A.D. No family hx of      Diabetes No family hx of       Breast Cancer No family hx of      Social History   Social History     Tobacco Use     Smoking status: Former Smoker     Smokeless tobacco: Never Used     Tobacco comment: socially in college   Substance Use Topics     Alcohol use: Yes     Comment: occ     Drug use: No      Past medical history, past surgical history, medications, allergies, family history, and social history were reviewed with the patient. No additional pertinent items.       Review of Systems  A complete review of systems was performed with pertinent positives and negatives noted in the HPI, and all other systems negative.    Physical Exam   BP: (!) 145/96  Pulse: 88  Temp: 97.9  F (36.6  C)  Resp: 20  Weight: 64.4 kg (142 lb)  SpO2: 100 %  Physical Exam  GEN: Well appearing, non toxic, cooperative and conversant.   HEENT: The head is normocephalic and atraumatic. Pupils are equal round and reactive to light. Extraocular motions are intact. There is no facial swelling. The neck is nontender and supple.   CV: Regular rate and rhythm without murmurs rubs or gallops. 2+ radial pulses bilaterally.  PULM: Clear to auscultation bilaterally.  ABD: Soft, nontender, nondistended.   EXT: Full range of motion.  No edema.  NEURO: Cranial nerves II through XII are intact and symmetric. Bilateral upper and lower extremities grossly show full range of motion without any focal deficits.   SKIN: No rashes, ecchymosis, or lacerations  PSYCH: Calm and cooperative, interactive.     ED Course      Procedures            EKG Interpretation:      Interpreted by Huseyin Adkins  Time reviewed: 23:51:17  Symptoms at time of EKG: Chest pain  Rhythm: normal sinus   Rate: normal  Axis: normal  Ectopy: none  Conduction: normal  ST Segments/ T Waves: No ST-T wave changes  Q Waves: none  Comparison to prior: Unchanged    Clinical Impression: normal EKG    CXR clear                 Results for orders placed or performed during the hospital encounter of 05/19/22   Chest XR,  PA &  LAT     Status: None    Narrative    EXAM: XR CHEST 2 VW  LOCATION: [facility]  DATE/TIME: 5/20/2022 12:00 AM    INDICATION: chest pain  COMPARISON: 07/18/2017.      Impression    IMPRESSION: Negative chest.   [place] Draw     Status: None    Narrative    The following orders were created for panel order [place] Draw.  Procedure                               Abnormality         Status                     ---------                               -----------         ------                     Extra Blue Top Tube[[id number]]                              Final result               Extra Red Top Tube[[id number]]                               Final result               Extra Green Top (Lithium...[[id number]]                      Final result               Extra Purple Top Tube[[id number]]                            Final result                 Please view results for these tests on the individual orders.   Extra Blue Top Tube     Status: None   Result Value Ref Range    Hold Specimen JIC    Extra Red Top Tube     Status: None   Result Value Ref Range    Hold Specimen JIC    Extra Green Top (Lithium Heparin) Tube     Status: None   Result Value Ref Range    Hold Specimen JIC    Extra Purple Top Tube     Status: None   Result Value Ref Range    Hold Specimen JIC    Comprehensive metabolic panel     Status: Normal   Result Value Ref Range    Sodium 141 133 - 144 mmol/L    Potassium 4.2 3.4 - 5.3 mmol/L    Chloride 108 94 - 109 mmol/L    Carbon Dioxide (CO2) 24 20 - 32 mmol/L    Anion Gap 9 3 - 14 mmol/L    Urea Nitrogen 12 7 - 30 mg/dL    Creatinine 0.71 0.52 - 1.04 mg/dL    Calcium 8.5 8.5 - 10.1 mg/dL    Glucose 86 70 - 99 mg/dL    Alkaline Phosphatase 85 40 - 150 U/L    AST 38 0 - 45 U/L    ALT 29 0 - 50 U/L    Protein Total 8.0 6.8 - 8.8 g/dL    Albumin 4.0 3.4 - 5.0 g/dL    Bilirubin Total 0.2 0.2 - 1.3 mg/dL    GFR Estimate >90 >60 mL/min/1.73m2   Troponin I     Status: Normal    Result Value Ref Range    Troponin I High Sensitivity 4 <54 ng/L   CBC with platelets and differential     Status: Abnormal   Result Value Ref Range    WBC Count 5.1 4.0 - 11.0 10e3/uL    RBC Count 4.21 3.80 - 5.20 10e6/uL    Hemoglobin 14.0 11.7 - 15.7 g/dL    Hematocrit 40.9 35.0 - 47.0 %    MCV 97 78 - 100 fL    MCH 33.3 (H) 26.5 - 33.0 pg    MCHC 34.2 31.5 - 36.5 g/dL    RDW 12.8 10.0 - 15.0 %    Platelet Count 314 150 - 450 10e3/uL    % Neutrophils 36 %    % Lymphocytes 50 %    % Monocytes 10 %    % Eosinophils 2 %    % Basophils 2 %    % Immature Granulocytes 0 %    NRBCs per 100 WBC 0 <1 /100    Absolute Neutrophils 1.8 1.6 - 8.3 10e3/uL    Absolute Lymphocytes 2.6 0.8 - 5.3 10e3/uL    Absolute Monocytes 0.5 0.0 - 1.3 10e3/uL    Absolute Eosinophils 0.1 0.0 - 0.7 10e3/uL    Absolute Basophils 0.1 0.0 - 0.2 10e3/uL    Absolute Immature Granulocytes 0.0 <=0.4 10e3/uL    Absolute NRBCs 0.0 10e3/uL   D dimer quantitative     Status: Normal   Result Value Ref Range    D-Dimer Quantitative 0.30 0.00 - 0.50 ug/mL FEU    Narrative    This D-dimer assay is intended for use in conjunction with a clinical pretest probability assessment model to exclude pulmonary embolism (PE) and deep venous thrombosis (DVT) in outpatients suspected of PE or DVT. The cut-off value is 0.50 ug/mL FEU.   EKG 12-lead, tracing only     Status: None (Preliminary result)   Result Value Ref Range    Systolic Blood Pressure  mmHg    Diastolic Blood Pressure  mmHg    Ventricular Rate 82 BPM    Atrial Rate 82 BPM    RI Interval 142 ms    QRS Duration 78 ms     ms    QTc 429 ms    P Axis 68 degrees    R AXIS 71 degrees    T Axis 70 degrees    Interpretation ECG Sinus rhythm  Normal ECG      CBC with platelets differential     Status: Abnormal    Narrative    The following orders were created for panel order CBC with platelets differential.  Procedure                               Abnormality         Status                     ---------                                -----------         ------                     CBC with platelets and d...[[id number]]  Abnormal            Final result                 Please view results for these tests on the individual orders.     Medications - No data to display     Assessments & Plan (with Medical Decision Making)   52-year-old female with pleuritic chest wall pain as described    DDx is broad including  Acute coronary syndrome, pulmonary embolism, pneumonia, pneumothorax, congestive heart failure, uremia, renal failure, among other causes    Clinically the patient is well-appearing nontoxic.  Exam is unrevealing  Chest x-ray is clear  ECG is nonischemic no evidence of dysrhythmia  Labs reviewed and are overall unrevealing with a negative troponin, D-dimer negative    Pt well appearing throughout ed stay and HDS.  Reassuring w/u  Broad ddx considered and emergent etiologies are less likely.   Appropriate for outpt f/u      - Patient agrees to our plan and is ready and eager for discharge. Care plan, follow up plan, and reasons to return immediately to the ED were dicussed in detail and summarized as noted in the discharge instructions.        I have reviewed the nursing notes. I have reviewed the findings, diagnosis, plan and need for follow up with the patient.    New Prescriptions    No medications on file       Final diagnoses:   Pleuritic chest pain       --  Damion Salas MD  [facility] EMERGENCY DEPARTMENT  5/19/2022     Damion Salas MD  05/20/22 0142

## 2022-05-29 DIAGNOSIS — I10 ESSENTIAL HYPERTENSION WITH GOAL BLOOD PRESSURE LESS THAN 140/90: ICD-10-CM

## 2022-06-01 RX ORDER — LISINOPRIL 20 MG/1
TABLET ORAL
Qty: 30 TABLET | Refills: 1 | OUTPATIENT
Start: 2022-06-01

## 2022-06-01 NOTE — TELEPHONE ENCOUNTER
Message sent to pharmacy - Refusal reason: Patient has requested refill too soon (ORDER SENT 5/3/22 FOR 2 MO SUPPLY TO REQUESTING [id number].  Lana HAIRSTON

## 2022-07-07 DIAGNOSIS — I10 ESSENTIAL HYPERTENSION WITH GOAL BLOOD PRESSURE LESS THAN 140/90: ICD-10-CM

## 2022-07-11 RX ORDER — LISINOPRIL 20 MG/1
TABLET ORAL
Qty: 30 TABLET | Refills: 1 | Status: SHIPPED | OUTPATIENT
Start: 2022-07-11

## 2022-07-11 NOTE — TELEPHONE ENCOUNTER
Routing refill request to provider for review/approval because:  --BP not at goal.  --Last visit:  5/3/2022 [place] - Return in about 4 weeks (around 5/31/2022) for follow up, Hypertension follow up    --Future Visit:   Next 5 appointments (look out 90 days)    Aug 30, 2022  8:00 AM  (Arrive by 7:40 AM)  Provider Visit with Nuria Dasilva MD  [facility] ([facility] ) [address] [place] [address] [phone]                BP Readings from Last 6 Encounters:   05/19/22 (!) 145/96   05/03/22 (!) 160/90   01/06/22 (!) 144/100   07/18/17 118/82   12/17/13 118/68   10/16/13 122/84

## 2022-07-25 ENCOUNTER — DOCUMENTATION ONLY (OUTPATIENT)
Dept: PHYSICAL MEDICINE AND REHAB | Facility: CLINIC | Age: 52
End: 2022-07-25

## 2022-07-25 NOTE — PROGRESS NOTES
Left detailed message for pt regarding rescheduled appt from 9/6 to 11/3 at 2:00pm with Dr HOPKINS.   Due to Dr HOPKINS is out of the office.    Thanks   Elyse

## 2022-08-29 ASSESSMENT — PATIENT HEALTH QUESTIONNAIRE - PHQ9
SUM OF ALL RESPONSES TO PHQ QUESTIONS 1-9: 8
SUM OF ALL RESPONSES TO PHQ QUESTIONS 1-9: 8
10. IF YOU CHECKED OFF ANY PROBLEMS, HOW DIFFICULT HAVE THESE PROBLEMS MADE IT FOR YOU TO DO YOUR WORK, TAKE CARE OF THINGS AT HOME, OR GET ALONG WITH OTHER PEOPLE: SOMEWHAT DIFFICULT

## 2022-08-30 ENCOUNTER — VIRTUAL VISIT (OUTPATIENT)
Dept: FAMILY MEDICINE | Facility: CLINIC | Age: 52
End: 2022-08-30
Payer: COMMERCIAL

## 2022-08-30 DIAGNOSIS — F41.1 GENERALIZED ANXIETY DISORDER WITH PANIC ATTACKS: ICD-10-CM

## 2022-08-30 DIAGNOSIS — F33.1 MAJOR DEPRESSIVE DISORDER, RECURRENT EPISODE, MODERATE (H): ICD-10-CM

## 2022-08-30 DIAGNOSIS — F90.2 ATTENTION DEFICIT HYPERACTIVITY DISORDER, COMBINED TYPE: ICD-10-CM

## 2022-08-30 DIAGNOSIS — F43.22 ADJUSTMENT DISORDER WITH ANXIETY: ICD-10-CM

## 2022-08-30 DIAGNOSIS — U09.9 LONG COVID: Primary | ICD-10-CM

## 2022-08-30 DIAGNOSIS — F41.0 GENERALIZED ANXIETY DISORDER WITH PANIC ATTACKS: ICD-10-CM

## 2022-08-30 PROCEDURE — 99213 OFFICE O/P EST LOW 20 MIN: CPT | Mod: 95 | Performed by: FAMILY MEDICINE

## 2022-08-30 RX ORDER — MODAFINIL 200 MG/1
200 TABLET ORAL DAILY
Qty: 30 TABLET | Refills: 5 | Status: SHIPPED | OUTPATIENT
Start: 2022-08-30

## 2022-08-30 ASSESSMENT — PATIENT HEALTH QUESTIONNAIRE - PHQ9
10. IF YOU CHECKED OFF ANY PROBLEMS, HOW DIFFICULT HAVE THESE PROBLEMS MADE IT FOR YOU TO DO YOUR WORK, TAKE CARE OF THINGS AT HOME, OR GET ALONG WITH OTHER PEOPLE: SOMEWHAT DIFFICULT
SUM OF ALL RESPONSES TO PHQ QUESTIONS 1-9: 8

## 2022-08-30 ASSESSMENT — ENCOUNTER SYMPTOMS: NERVOUS/ANXIOUS: 1

## 2022-08-30 NOTE — PROGRESS NOTES
Caron is a 52 year old who is being evaluated via a billable telephone visit.      What phone number would you like to be contacted at? [phone]   How would you like to obtain your AVS? [facility]    Assessment & Plan     (U09.9) Long COVID  (primary encounter diagnosis)  Continues to have memory issues, brain fog, learning difficulties, complicated by ADHD and anxiety and depression as below:    (F41.1,  F41.0) Generalized anxiety disorder with panic attacks  (F33.1) Major depressive disorder, recurrent episode, moderate (H)  (F43.22) Adjustment disorder with anxiety    (F90.2) Attention deficit hyperactivity disorder, combined type  Comment: responding well to modafinil, she would like a dose increase  Plan: modafinil (PROVIGIL) 200 MG tablet        Return to clinic 6 months for ADHD refill if still living in [place]      Return in about 4 weeks (around 2022) for preventive visit (wellness/annual physical exam).    Nuria Dasilva MD  [facility]   Caron is a 52 year old, presenting for the following health issues:  Brain fog, anxiety and depression due to covid.  She's been taking modafinil which has helped her focus, and has reduced her anxiety as well. She has found it wears off after 4-5 hours.  Lots of stress, mother , son started college, financial stress because disability wasn't approved, no longer employed and doesn't have health insurance.  Her family (6 brothers and sisters) doesn't believe in covid, hasn't been supportive.  She has found that walking daily, 3-6 miles daily, has helped as well.  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    long covid , Anxiety, and Depression      PHQ 2022   PHQ-9 Total Score 15 17 8   Q9: Thoughts of better off dead/self-harm past 2 weeks Not at all Several days Not at all   F/U: Thoughts of suicide or self-harm - No -   F/U: Safety concerns - No -     ARIANNA-7 SCORE 2022  2/21/2022 5/16/2022   Total Score - - -   Total Score 18 (severe anxiety) 20 (severe anxiety) 8 (mild anxiety)   Total Score 18 20 8     BP Readings from Last 3 Encounters:   05/19/22 (!) 145/96   05/03/22 (!) 160/90   01/06/22 (!) 144/100         Medication change would like to increase it  Pt mention that the covid brain fog is bad   Thrid week of June had covid    Anxiety    History of Present Illness       Reason for visit:  Attention problems / brain fog    She eats 2-3 servings of fruits and vegetables daily.She consumes 0 sweetened beverage(s) daily.She exercises with enough effort to increase her heart rate 30 to 60 minutes per day.  She exercises with enough effort to increase her heart rate 5 days per week. She is missing 2 dose(s) of medications per week.  She is not taking prescribed medications regularly due to cost of medication.    Today's PHQ-9         PHQ-9 Total Score: 8    PHQ-9 Q9 Thoughts of better off dead/self-harm past 2 weeks :   Not at all    How difficult have these problems made it for you to do your work, take care of things at home, or get along with other people: Somewhat difficult       Depression and Anxiety Follow-Up    How are you doing with your depression since your last visit? Improved     How are you doing with your anxiety since your last visit?  Improved     Are you having other symptoms that might be associated with depression or anxiety? Yes:  Brain Fog     Have you had a significant life event? Loss of her mother      Do you have any concerns with your use of alcohol or other drugs? No    Social History     Tobacco Use     Smoking status: Former Smoker     Smokeless tobacco: Never Used     Tobacco comment: socially in college   Substance Use Topics     Alcohol use: Yes     Comment: occ     Drug use: No     PHQ 2/8/2022 2/21/2022 8/29/2022   PHQ-9 Total Score 15 17 8   Q9: Thoughts of better off dead/self-harm past 2 weeks Not at all Several days Not at all   F/U: Thoughts  of suicide or self-harm - No -   F/U: Safety concerns - No -     ARIANNA-7 SCORE 2/8/2022 2/21/2022 5/16/2022   Total Score - - -   Total Score 18 (severe anxiety) 20 (severe anxiety) 8 (mild anxiety)   Total Score 18 20 8       Suicide Assessment Five-step Evaluation and Treatment (SAFE-T)        Review of Systems   Psychiatric/Behavioral: The patient is nervous/anxious.       Constitutional, HEENT, cardiovascular, pulmonary, GI, , musculoskeletal, neuro, skin, endocrine and psych systems are negative, except as otherwise noted.      Objective           Vitals:  No vitals were obtained today due to virtual visit.    Physical Exam   healthy, alert and no distress  PSYCH: Alert and oriented times 3; coherent speech, normal   rate and volume, able to articulate logical thoughts, able   to abstract reason, no tangential thoughts, no hallucinations   or delusions  Her affect is normal  RESP: No cough, no audible wheezing, able to talk in full sentences  Remainder of exam unable to be completed due to telephone visits          Phone call duration: 15 minutes    .  ..

## 2022-10-25 ENCOUNTER — TELEPHONE (OUTPATIENT)
Dept: FAMILY MEDICINE | Facility: CLINIC | Age: 52
End: 2022-10-25

## 2022-10-25 NOTE — TELEPHONE ENCOUNTER
Forms/Letter Request    Type of form/letter: Disability    Have you been seen for this request: Unsure    Do we have the form/letter: Yes: Form folder POD C    When is form/letter needed by: As soon as able    How would you like the form/letter returned: Fax

## 2022-10-28 NOTE — TELEPHONE ENCOUNTER
I received a disability form for her, but I haven't discussed time off work, and I need to have an appointment with her to review specific details of the disability form. Please call her and ask her to schedule a virtual appointment with me to review and complete the form, thank you!    Sincerely,  Dr. Nuria Dasilva MD  10/28/2022

## 2022-11-19 ENCOUNTER — HEALTH MAINTENANCE LETTER (OUTPATIENT)
Age: 52
End: 2022-11-19

## 2023-04-09 ENCOUNTER — HEALTH MAINTENANCE LETTER (OUTPATIENT)
Age: 53
End: 2023-04-09

## 2024-04-10 ENCOUNTER — TELEPHONE (OUTPATIENT)
Dept: FAMILY MEDICINE | Facility: CLINIC | Age: 54
End: 2024-04-10
Payer: COMMERCIAL

## 2024-04-10 NOTE — TELEPHONE ENCOUNTER
Reason for Call:  Appointment Request    Patient requesting this type of appt:  Preventive     Requested provider: Nuria Dasilva    Reason patient unable to be scheduled: Not within requested timeframe    When does patient want to be seen/preferred time: 1-2 weeks    Comments: Pt willing to come in a little later, but scheduled for July 12th.  Pt is experiencing a lot of MH issues, and this needs to be addressed and seen.  Pt does need help w/Preventative and MH.    Could we send this information to you in [employer] or would you prefer to receive a phone call?:   Patient would prefer a phone call   Okay to leave a detailed message?: Yes at Cell number on file:    Telephone Information:   Mobile [phone]       Call taken on 4/10/2024 at 4:43 PM by HOLLY PARADA

## 2024-04-11 NOTE — TELEPHONE ENCOUNTER
Attempt #2. No answer. LVM to call back and speak with a triage nurse.     Sarah Vázquez RN  [facility]

## 2024-04-11 NOTE — TELEPHONE ENCOUNTER
RN made 1st call attempt regarding pt's inquiry requesting appt with Dr Dasilva within 1-2 wks. .  No answer. LM on   with instruction to call back and ask for triage nurse.    If pt return call, please triage.     Lilo RANDOLPH RN  [facility]         Reason for Call:  Appointment Request     Patient requesting this type of appt:  Preventive      Requested provider: Nuria Dasilva     Reason patient unable to be scheduled: Not within requested timeframe     When does patient want to be seen/preferred time: 1-2 weeks     Comments: Pt willing to come in a little later, but scheduled for July 12th.  Pt is experiencing a lot of MH issues, and this needs to be addressed and seen.  Pt does need help w/Preventative and MH.

## 2024-04-12 NOTE — TELEPHONE ENCOUNTER
Writer spoke with patient and states she feels comfortable with waiting until the appointment in July.     WANDY Padgett RN  [facility]

## 2024-06-15 ENCOUNTER — HEALTH MAINTENANCE LETTER (OUTPATIENT)
Age: 54
End: 2024-06-15

## 2025-04-13 ENCOUNTER — HEALTH MAINTENANCE LETTER (OUTPATIENT)
Age: 55
End: 2025-04-13

## 2025-07-06 ENCOUNTER — HEALTH MAINTENANCE LETTER (OUTPATIENT)
Age: 55
End: 2025-07-06